# Patient Record
Sex: FEMALE | Race: WHITE | NOT HISPANIC OR LATINO | Employment: UNEMPLOYED | URBAN - NONMETROPOLITAN AREA
[De-identification: names, ages, dates, MRNs, and addresses within clinical notes are randomized per-mention and may not be internally consistent; named-entity substitution may affect disease eponyms.]

---

## 2024-06-04 ENCOUNTER — HOSPITAL ENCOUNTER (INPATIENT)
Facility: HOSPITAL | Age: 39
LOS: 6 days | Discharge: HOME | DRG: 896 | End: 2024-06-10
Attending: PSYCHIATRY & NEUROLOGY | Admitting: PSYCHIATRY & NEUROLOGY
Payer: MEDICAID

## 2024-06-04 ENCOUNTER — HOSPITAL ENCOUNTER (EMERGENCY)
Facility: HOSPITAL | Age: 39
Discharge: PSYCHIATRIC OR SUBSTANCE USE FACILITY | DRG: 896 | End: 2024-06-04
Attending: STUDENT IN AN ORGANIZED HEALTH CARE EDUCATION/TRAINING PROGRAM | Admitting: STUDENT IN AN ORGANIZED HEALTH CARE EDUCATION/TRAINING PROGRAM
Payer: MEDICAID

## 2024-06-04 VITALS
TEMPERATURE: 98.9 F | HEART RATE: 73 BPM | BODY MASS INDEX: 18.83 KG/M2 | SYSTOLIC BLOOD PRESSURE: 157 MMHG | RESPIRATION RATE: 18 BRPM | OXYGEN SATURATION: 96 % | WEIGHT: 120 LBS | HEIGHT: 67 IN | DIASTOLIC BLOOD PRESSURE: 99 MMHG

## 2024-06-04 DIAGNOSIS — F19.10 SUBSTANCE ABUSE: Primary | ICD-10-CM

## 2024-06-04 LAB
ALBUMIN SERPL-MCNC: 4.6 G/DL (ref 3.5–5.2)
ALBUMIN/GLOB SERPL: 1.3 G/DL
ALP SERPL-CCNC: 128 U/L (ref 39–117)
ALT SERPL W P-5'-P-CCNC: 11 U/L (ref 1–33)
AMPHET+METHAMPHET UR QL: POSITIVE
AMPHETAMINES UR QL: POSITIVE
ANION GAP SERPL CALCULATED.3IONS-SCNC: 13.7 MMOL/L (ref 5–15)
AST SERPL-CCNC: 13 U/L (ref 1–32)
B-HCG UR QL: NEGATIVE
BACTERIA UR QL AUTO: ABNORMAL /HPF
BARBITURATES UR QL SCN: NEGATIVE
BASOPHILS # BLD AUTO: 0.03 10*3/MM3 (ref 0–0.2)
BASOPHILS NFR BLD AUTO: 0.3 % (ref 0–1.5)
BENZODIAZ UR QL SCN: NEGATIVE
BILIRUB SERPL-MCNC: 0.7 MG/DL (ref 0–1.2)
BILIRUB UR QL STRIP: ABNORMAL
BUN SERPL-MCNC: 12 MG/DL (ref 6–20)
BUN/CREAT SERPL: 15 (ref 7–25)
BUPRENORPHINE SERPL-MCNC: NEGATIVE NG/ML
CALCIUM SPEC-SCNC: 10.1 MG/DL (ref 8.6–10.5)
CANNABINOIDS SERPL QL: NEGATIVE
CHLORIDE SERPL-SCNC: 101 MMOL/L (ref 98–107)
CLARITY UR: ABNORMAL
CO2 SERPL-SCNC: 21.3 MMOL/L (ref 22–29)
COCAINE UR QL: NEGATIVE
COLOR UR: ABNORMAL
CREAT SERPL-MCNC: 0.8 MG/DL (ref 0.57–1)
DEPRECATED RDW RBC AUTO: 38 FL (ref 37–54)
EGFRCR SERPLBLD CKD-EPI 2021: 96.9 ML/MIN/1.73
EOSINOPHIL # BLD AUTO: 0.01 10*3/MM3 (ref 0–0.4)
EOSINOPHIL NFR BLD AUTO: 0.1 % (ref 0.3–6.2)
ERYTHROCYTE [DISTWIDTH] IN BLOOD BY AUTOMATED COUNT: 14 % (ref 12.3–15.4)
ETHANOL BLD-MCNC: <10 MG/DL (ref 0–10)
ETHANOL UR QL: <0.01 %
FENTANYL UR-MCNC: POSITIVE NG/ML
GLOBULIN UR ELPH-MCNC: 3.5 GM/DL
GLUCOSE SERPL-MCNC: 128 MG/DL (ref 65–99)
GLUCOSE UR STRIP-MCNC: NEGATIVE MG/DL
HCT VFR BLD AUTO: 46.1 % (ref 34–46.6)
HGB BLD-MCNC: 14.8 G/DL (ref 12–15.9)
HGB UR QL STRIP.AUTO: NEGATIVE
HYALINE CASTS UR QL AUTO: ABNORMAL /LPF
IMM GRANULOCYTES # BLD AUTO: 0.05 10*3/MM3 (ref 0–0.05)
IMM GRANULOCYTES NFR BLD AUTO: 0.4 % (ref 0–0.5)
KETONES UR QL STRIP: ABNORMAL
LEUKOCYTE ESTERASE UR QL STRIP.AUTO: NEGATIVE
LYMPHOCYTES # BLD AUTO: 1.44 10*3/MM3 (ref 0.7–3.1)
LYMPHOCYTES NFR BLD AUTO: 12.5 % (ref 19.6–45.3)
MAGNESIUM SERPL-MCNC: 1.9 MG/DL (ref 1.6–2.6)
MCH RBC QN AUTO: 24.5 PG (ref 26.6–33)
MCHC RBC AUTO-ENTMCNC: 32.1 G/DL (ref 31.5–35.7)
MCV RBC AUTO: 76.3 FL (ref 79–97)
METHADONE UR QL SCN: NEGATIVE
MONOCYTES # BLD AUTO: 0.37 10*3/MM3 (ref 0.1–0.9)
MONOCYTES NFR BLD AUTO: 3.2 % (ref 5–12)
NEUTROPHILS NFR BLD AUTO: 83.5 % (ref 42.7–76)
NEUTROPHILS NFR BLD AUTO: 9.62 10*3/MM3 (ref 1.7–7)
NITRITE UR QL STRIP: NEGATIVE
NRBC BLD AUTO-RTO: 0 /100 WBC (ref 0–0.2)
OPIATES UR QL: NEGATIVE
OXYCODONE UR QL SCN: NEGATIVE
PCP UR QL SCN: NEGATIVE
PH UR STRIP.AUTO: 5.5 [PH] (ref 5–8)
PLATELET # BLD AUTO: 464 10*3/MM3 (ref 140–450)
PMV BLD AUTO: 9.2 FL (ref 6–12)
POTASSIUM SERPL-SCNC: 3.6 MMOL/L (ref 3.5–5.2)
PROT SERPL-MCNC: 8.1 G/DL (ref 6–8.5)
PROT UR QL STRIP: ABNORMAL
RBC # BLD AUTO: 6.04 10*6/MM3 (ref 3.77–5.28)
RBC # UR STRIP: ABNORMAL /HPF
REF LAB TEST METHOD: ABNORMAL
SODIUM SERPL-SCNC: 136 MMOL/L (ref 136–145)
SP GR UR STRIP: >1.03 (ref 1–1.03)
SQUAMOUS #/AREA URNS HPF: ABNORMAL /HPF
TRICYCLICS UR QL SCN: NEGATIVE
UROBILINOGEN UR QL STRIP: ABNORMAL
WBC # UR STRIP: ABNORMAL /HPF
WBC NRBC COR # BLD AUTO: 11.52 10*3/MM3 (ref 3.4–10.8)

## 2024-06-04 PROCEDURE — 99285 EMERGENCY DEPT VISIT HI MDM: CPT

## 2024-06-04 PROCEDURE — 80053 COMPREHEN METABOLIC PANEL: CPT | Performed by: PHYSICIAN ASSISTANT

## 2024-06-04 PROCEDURE — 81001 URINALYSIS AUTO W/SCOPE: CPT | Performed by: PHYSICIAN ASSISTANT

## 2024-06-04 PROCEDURE — 63710000001 ONDANSETRON ODT 4 MG TABLET DISPERSIBLE: Performed by: PHYSICIAN ASSISTANT

## 2024-06-04 PROCEDURE — 81025 URINE PREGNANCY TEST: CPT | Performed by: PHYSICIAN ASSISTANT

## 2024-06-04 PROCEDURE — 85025 COMPLETE CBC W/AUTO DIFF WBC: CPT | Performed by: PHYSICIAN ASSISTANT

## 2024-06-04 PROCEDURE — 93005 ELECTROCARDIOGRAM TRACING: CPT | Performed by: STUDENT IN AN ORGANIZED HEALTH CARE EDUCATION/TRAINING PROGRAM

## 2024-06-04 PROCEDURE — 82077 ASSAY SPEC XCP UR&BREATH IA: CPT | Performed by: PHYSICIAN ASSISTANT

## 2024-06-04 PROCEDURE — 83735 ASSAY OF MAGNESIUM: CPT | Performed by: PHYSICIAN ASSISTANT

## 2024-06-04 PROCEDURE — 80307 DRUG TEST PRSMV CHEM ANLYZR: CPT | Performed by: PHYSICIAN ASSISTANT

## 2024-06-04 PROCEDURE — HZ2ZZZZ DETOXIFICATION SERVICES FOR SUBSTANCE ABUSE TREATMENT: ICD-10-PCS | Performed by: PSYCHIATRY & NEUROLOGY

## 2024-06-04 PROCEDURE — 36415 COLL VENOUS BLD VENIPUNCTURE: CPT

## 2024-06-04 RX ORDER — TRAZODONE HYDROCHLORIDE 50 MG/1
50 TABLET ORAL NIGHTLY PRN
Status: DISCONTINUED | OUTPATIENT
Start: 2024-06-04 | End: 2024-06-10 | Stop reason: HOSPADM

## 2024-06-04 RX ORDER — CYCLOBENZAPRINE HCL 10 MG
10 TABLET ORAL 3 TIMES DAILY PRN
Status: DISPENSED | OUTPATIENT
Start: 2024-06-04 | End: 2024-06-09

## 2024-06-04 RX ORDER — ECHINACEA PURPUREA EXTRACT 125 MG
2 TABLET ORAL AS NEEDED
Status: DISCONTINUED | OUTPATIENT
Start: 2024-06-04 | End: 2024-06-10 | Stop reason: HOSPADM

## 2024-06-04 RX ORDER — ONDANSETRON 4 MG/1
4 TABLET, ORALLY DISINTEGRATING ORAL ONCE
Status: COMPLETED | OUTPATIENT
Start: 2024-06-04 | End: 2024-06-04

## 2024-06-04 RX ORDER — CLONIDINE HYDROCHLORIDE 0.1 MG/1
0.1 TABLET ORAL 3 TIMES DAILY PRN
Status: ACTIVE | OUTPATIENT
Start: 2024-06-06 | End: 2024-06-07

## 2024-06-04 RX ORDER — DICYCLOMINE HYDROCHLORIDE 10 MG/1
10 CAPSULE ORAL 3 TIMES DAILY PRN
Status: DISPENSED | OUTPATIENT
Start: 2024-06-04 | End: 2024-06-09

## 2024-06-04 RX ORDER — CLONIDINE HYDROCHLORIDE 0.1 MG/1
0.1 TABLET ORAL ONCE
Status: COMPLETED | OUTPATIENT
Start: 2024-06-04 | End: 2024-06-04

## 2024-06-04 RX ORDER — VERAPAMIL HYDROCHLORIDE 80 MG/1
80 TABLET ORAL ONCE
Status: COMPLETED | OUTPATIENT
Start: 2024-06-04 | End: 2024-06-04

## 2024-06-04 RX ORDER — FAMOTIDINE 20 MG/1
20 TABLET, FILM COATED ORAL 2 TIMES DAILY PRN
Status: DISCONTINUED | OUTPATIENT
Start: 2024-06-04 | End: 2024-06-10 | Stop reason: HOSPADM

## 2024-06-04 RX ORDER — CLONIDINE HYDROCHLORIDE 0.1 MG/1
0.1 TABLET ORAL 2 TIMES DAILY PRN
Status: DISPENSED | OUTPATIENT
Start: 2024-06-07 | End: 2024-06-08

## 2024-06-04 RX ORDER — HYDROXYZINE 50 MG/1
50 TABLET, FILM COATED ORAL EVERY 6 HOURS PRN
Status: DISCONTINUED | OUTPATIENT
Start: 2024-06-04 | End: 2024-06-10 | Stop reason: HOSPADM

## 2024-06-04 RX ORDER — CLONIDINE HYDROCHLORIDE 0.1 MG/1
0.1 TABLET ORAL ONCE AS NEEDED
Status: ACTIVE | OUTPATIENT
Start: 2024-06-08 | End: 2024-06-09

## 2024-06-04 RX ORDER — BENZTROPINE MESYLATE 1 MG/ML
1 INJECTION INTRAMUSCULAR; INTRAVENOUS ONCE AS NEEDED
Status: DISCONTINUED | OUTPATIENT
Start: 2024-06-04 | End: 2024-06-10 | Stop reason: HOSPADM

## 2024-06-04 RX ORDER — IBUPROFEN 400 MG/1
400 TABLET ORAL EVERY 6 HOURS PRN
Status: DISCONTINUED | OUTPATIENT
Start: 2024-06-04 | End: 2024-06-10 | Stop reason: HOSPADM

## 2024-06-04 RX ORDER — ONDANSETRON 4 MG/1
4 TABLET, ORALLY DISINTEGRATING ORAL EVERY 6 HOURS PRN
Status: DISCONTINUED | OUTPATIENT
Start: 2024-06-04 | End: 2024-06-10 | Stop reason: HOSPADM

## 2024-06-04 RX ORDER — ALUMINA, MAGNESIA, AND SIMETHICONE 2400; 2400; 240 MG/30ML; MG/30ML; MG/30ML
15 SUSPENSION ORAL EVERY 6 HOURS PRN
Status: DISCONTINUED | OUTPATIENT
Start: 2024-06-04 | End: 2024-06-10 | Stop reason: HOSPADM

## 2024-06-04 RX ORDER — CLONIDINE HYDROCHLORIDE 0.1 MG/1
0.1 TABLET ORAL 4 TIMES DAILY PRN
Status: DISPENSED | OUTPATIENT
Start: 2024-06-05 | End: 2024-06-06

## 2024-06-04 RX ORDER — BENZONATATE 100 MG/1
100 CAPSULE ORAL 3 TIMES DAILY PRN
Status: DISCONTINUED | OUTPATIENT
Start: 2024-06-04 | End: 2024-06-10 | Stop reason: HOSPADM

## 2024-06-04 RX ORDER — LOPERAMIDE HYDROCHLORIDE 2 MG/1
2 CAPSULE ORAL
Status: DISCONTINUED | OUTPATIENT
Start: 2024-06-04 | End: 2024-06-10 | Stop reason: HOSPADM

## 2024-06-04 RX ORDER — ACETAMINOPHEN 325 MG/1
650 TABLET ORAL EVERY 6 HOURS PRN
Status: DISCONTINUED | OUTPATIENT
Start: 2024-06-04 | End: 2024-06-10 | Stop reason: HOSPADM

## 2024-06-04 RX ORDER — CLONIDINE HYDROCHLORIDE 0.1 MG/1
0.1 TABLET ORAL 4 TIMES DAILY PRN
Status: DISPENSED | OUTPATIENT
Start: 2024-06-04 | End: 2024-06-05

## 2024-06-04 RX ORDER — HYDRALAZINE HYDROCHLORIDE 25 MG/1
25 TABLET, FILM COATED ORAL DAILY PRN
Status: ACTIVE | OUTPATIENT
Start: 2024-06-04 | End: 2024-06-09

## 2024-06-04 RX ORDER — BENZTROPINE MESYLATE 1 MG/1
2 TABLET ORAL ONCE AS NEEDED
Status: DISCONTINUED | OUTPATIENT
Start: 2024-06-04 | End: 2024-06-10 | Stop reason: HOSPADM

## 2024-06-04 RX ADMIN — VERAPAMIL HYDROCHLORIDE 80 MG: 80 TABLET, FILM COATED ORAL at 23:01

## 2024-06-04 RX ADMIN — METOPROLOL TARTRATE 50 MG: 25 TABLET, FILM COATED ORAL at 19:38

## 2024-06-04 RX ADMIN — TRAZODONE HYDROCHLORIDE 50 MG: 50 TABLET ORAL at 23:01

## 2024-06-04 RX ADMIN — CLONIDINE HYDROCHLORIDE 0.1 MG: 0.1 TABLET ORAL at 18:32

## 2024-06-04 RX ADMIN — CLONIDINE HYDROCHLORIDE 0.1 MG: 0.1 TABLET ORAL at 17:57

## 2024-06-04 RX ADMIN — CLONIDINE HYDROCHLORIDE 0.1 MG: 0.1 TABLET ORAL at 21:21

## 2024-06-04 RX ADMIN — ONDANSETRON 4 MG: 4 TABLET, ORALLY DISINTEGRATING ORAL at 17:58

## 2024-06-04 RX ADMIN — HYDROXYZINE HYDROCHLORIDE 50 MG: 50 TABLET, FILM COATED ORAL at 23:01

## 2024-06-04 NOTE — ED PROVIDER NOTES
Subjective   History of Present Illness  38-year-old female presents secondary to need for detox from fentanyl.  Patient denies any IV drug use.  She denies any past medical problems.  She presents by private vehicle.  She denies any suicidal homicidal ideation.      Review of Systems   Constitutional: Negative.  Negative for fever.   HENT: Negative.     Respiratory: Negative.     Cardiovascular: Negative.  Negative for chest pain.   Gastrointestinal: Negative.  Negative for abdominal pain.   Endocrine: Negative.    Genitourinary: Negative.  Negative for dysuria.   Skin: Negative.    Neurological: Negative.    Psychiatric/Behavioral: Negative.  Negative for suicidal ideas.    All other systems reviewed and are negative.      No past medical history on file.    No Known Allergies    No past surgical history on file.    No family history on file.    Social History     Socioeconomic History    Marital status: Single   Tobacco Use    Smoking status: Every Day     Current packs/day: 1.00     Average packs/day: 1 pack/day for 2.4 years (2.4 ttl pk-yrs)     Types: Cigarettes     Start date: 2022     Passive exposure: Current    Smokeless tobacco: Never   Vaping Use    Vaping status: Never Used   Substance and Sexual Activity    Alcohol use: Never    Drug use: Yes     Types: Fentanyl    Sexual activity: Defer           Objective   Physical Exam  Vitals and nursing note reviewed.   Constitutional:       General: She is not in acute distress.     Appearance: She is well-developed. She is not diaphoretic.   HENT:      Head: Normocephalic and atraumatic.      Right Ear: External ear normal.      Left Ear: External ear normal.      Nose: Nose normal.   Eyes:      Conjunctiva/sclera: Conjunctivae normal.      Pupils: Pupils are equal, round, and reactive to light.   Neck:      Vascular: No JVD.      Trachea: No tracheal deviation.   Cardiovascular:      Rate and Rhythm: Normal rate and regular rhythm.      Heart sounds: Normal  heart sounds. No murmur heard.  Pulmonary:      Effort: Pulmonary effort is normal. No respiratory distress.      Breath sounds: Normal breath sounds. No wheezing.   Abdominal:      General: Bowel sounds are normal.      Palpations: Abdomen is soft.      Tenderness: There is no abdominal tenderness.   Musculoskeletal:         General: No deformity. Normal range of motion.      Cervical back: Normal range of motion and neck supple.   Skin:     General: Skin is warm and dry.      Coloration: Skin is not pale.      Findings: No erythema or rash.   Neurological:      Mental Status: She is alert and oriented to person, place, and time.      Cranial Nerves: No cranial nerve deficit.   Psychiatric:         Behavior: Behavior normal.         Thought Content: Thought content normal.         Procedures           ED Course                                 Results for orders placed or performed during the hospital encounter of 06/04/24   Comprehensive Metabolic Panel    Specimen: Arm, Left; Blood   Result Value Ref Range    Glucose 128 (H) 65 - 99 mg/dL    BUN 12 6 - 20 mg/dL    Creatinine 0.80 0.57 - 1.00 mg/dL    Sodium 136 136 - 145 mmol/L    Potassium 3.6 3.5 - 5.2 mmol/L    Chloride 101 98 - 107 mmol/L    CO2 21.3 (L) 22.0 - 29.0 mmol/L    Calcium 10.1 8.6 - 10.5 mg/dL    Total Protein 8.1 6.0 - 8.5 g/dL    Albumin 4.6 3.5 - 5.2 g/dL    ALT (SGPT) 11 1 - 33 U/L    AST (SGOT) 13 1 - 32 U/L    Alkaline Phosphatase 128 (H) 39 - 117 U/L    Total Bilirubin 0.7 0.0 - 1.2 mg/dL    Globulin 3.5 gm/dL    A/G Ratio 1.3 g/dL    BUN/Creatinine Ratio 15.0 7.0 - 25.0    Anion Gap 13.7 5.0 - 15.0 mmol/L    eGFR 96.9 >60.0 mL/min/1.73   Pregnancy, Urine - Urine, Clean Catch    Specimen: Urine, Clean Catch   Result Value Ref Range    HCG, Urine QL Negative Negative   Urinalysis With Microscopic If Indicated (No Culture) - Urine, Clean Catch    Specimen: Urine, Clean Catch   Result Value Ref Range    Color, UA Dark Yellow (A) Yellow, Straw     Appearance, UA Cloudy (A) Clear    pH, UA 5.5 5.0 - 8.0    Specific Gravity, UA >1.030 (H) 1.005 - 1.030    Glucose, UA Negative Negative    Ketones, UA Trace (A) Negative    Bilirubin, UA Small (1+) (A) Negative    Blood, UA Negative Negative    Protein,  mg/dL (2+) (A) Negative    Leuk Esterase, UA Negative Negative    Nitrite, UA Negative Negative    Urobilinogen, UA 0.2 E.U./dL 0.2 - 1.0 E.U./dL   Ethanol    Specimen: Arm, Left; Blood   Result Value Ref Range    Ethanol <10 0 - 10 mg/dL    Ethanol % <0.010 %   Urine Drug Screen - Urine, Clean Catch    Specimen: Urine, Clean Catch   Result Value Ref Range    THC, Screen, Urine Negative Negative    Phencyclidine (PCP), Urine Negative Negative    Cocaine Screen, Urine Negative Negative    Methamphetamine, Ur Positive (A) Negative    Opiate Screen Negative Negative    Amphetamine Screen, Urine Positive (A) Negative    Benzodiazepine Screen, Urine Negative Negative    Tricyclic Antidepressants Screen Negative Negative    Methadone Screen, Urine Negative Negative    Barbiturates Screen, Urine Negative Negative    Oxycodone Screen, Urine Negative Negative    Buprenorphine, Screen, Urine Negative Negative   Magnesium    Specimen: Arm, Left; Blood   Result Value Ref Range    Magnesium 1.9 1.6 - 2.6 mg/dL   CBC Auto Differential    Specimen: Arm, Left; Blood   Result Value Ref Range    WBC 11.52 (H) 3.40 - 10.80 10*3/mm3    RBC 6.04 (H) 3.77 - 5.28 10*6/mm3    Hemoglobin 14.8 12.0 - 15.9 g/dL    Hematocrit 46.1 34.0 - 46.6 %    MCV 76.3 (L) 79.0 - 97.0 fL    MCH 24.5 (L) 26.6 - 33.0 pg    MCHC 32.1 31.5 - 35.7 g/dL    RDW 14.0 12.3 - 15.4 %    RDW-SD 38.0 37.0 - 54.0 fl    MPV 9.2 6.0 - 12.0 fL    Platelets 464 (H) 140 - 450 10*3/mm3    Neutrophil % 83.5 (H) 42.7 - 76.0 %    Lymphocyte % 12.5 (L) 19.6 - 45.3 %    Monocyte % 3.2 (L) 5.0 - 12.0 %    Eosinophil % 0.1 (L) 0.3 - 6.2 %    Basophil % 0.3 0.0 - 1.5 %    Immature Grans % 0.4 0.0 - 0.5 %    Neutrophils,  Absolute 9.62 (H) 1.70 - 7.00 10*3/mm3    Lymphocytes, Absolute 1.44 0.70 - 3.10 10*3/mm3    Monocytes, Absolute 0.37 0.10 - 0.90 10*3/mm3    Eosinophils, Absolute 0.01 0.00 - 0.40 10*3/mm3    Basophils, Absolute 0.03 0.00 - 0.20 10*3/mm3    Immature Grans, Absolute 0.05 0.00 - 0.05 10*3/mm3    nRBC 0.0 0.0 - 0.2 /100 WBC   Fentanyl, Urine - Urine, Clean Catch    Specimen: Urine, Clean Catch   Result Value Ref Range    Fentanyl, Urine Positive (A) Negative   Urinalysis, Microscopic Only - Urine, Clean Catch    Specimen: Urine, Clean Catch   Result Value Ref Range    RBC, UA 3-5 (A) None Seen, 0-2 /HPF    WBC, UA 3-5 (A) None Seen, 0-2 /HPF    Bacteria, UA None Seen None Seen /HPF    Squamous Epithelial Cells, UA 7-12 (A) None Seen, 0-2 /HPF    Hyaline Casts, UA None Seen None Seen /LPF    Methodology Manual Light Microscopy    ECG 12 Lead QT Measurement   Result Value Ref Range    QT Interval 418 ms    QTC Interval 479 ms                 Medical Decision Making  38-year-old female presents secondary to need for detox from fentanyl.  Patient denies any IV drug use.  She denies any past medical problems.  She presents by private vehicle.  She denies any suicidal homicidal ideation.    Amount and/or Complexity of Data Reviewed  Labs: ordered. Decision-making details documented in ED Course.  ECG/medicine tests: ordered.  Discussion of management or test interpretation with external provider(s): On-call psychiatrist via the intake nurse.    Risk  Prescription drug management.        Final diagnoses:   Substance abuse       ED Disposition  ED Disposition       ED Disposition   DC/Transfer to Behavioral Health    Condition   Stable    Comment   --               No follow-up provider specified.       Medication List      No changes were made to your prescriptions during this visit.            Zach Hernandez PA  06/04/24 1936

## 2024-06-04 NOTE — NURSING NOTE
"Assessment completed. Pt brought from [facility]. Pt states, \"I'm really, really sick and the place couldn't handle my detox\" Pt states need \"to get clean\". Pt verbalizes has been using fentanyl daily for 2 years, 2 grams by smoking. Pt COWS 13. Pt rates cravings 10/10 using 0-10 scale. Pt rates anxiety 10/10, using 0-10 scale depression 5/10, using 0-10 scale. Pt current cigarette use 1 ppd smoker for 2 1/2 years. Pt denies any other drug or alcohol use, however, pt UDS + for amphetamine, meth, and fentanyl. PT WBC 11.52.   "

## 2024-06-04 NOTE — NURSING NOTE
Pt given additional po dose of 0.1mg clonidine, pt took with small sip of water and encouraged to remain sitting up.

## 2024-06-04 NOTE — NURSING NOTE
Called via phone and spoke to Dr. DARREN Mallory. Intake assessment, labs and vital signs provided. Instructed to admit chemical dependency unit on clonidine detox, routine orders. RBVOx2.

## 2024-06-05 PROBLEM — F11.20 OPIOID USE DISORDER, SEVERE, DEPENDENCE: Status: ACTIVE | Noted: 2024-06-04

## 2024-06-05 PROBLEM — F15.20 METHAMPHETAMINE USE DISORDER, SEVERE, DEPENDENCE: Status: ACTIVE | Noted: 2024-06-05

## 2024-06-05 PROBLEM — K21.9 GERD (GASTROESOPHAGEAL REFLUX DISEASE): Status: ACTIVE | Noted: 2024-06-05

## 2024-06-05 PROBLEM — E43 SEVERE MALNUTRITION: Status: ACTIVE | Noted: 2024-06-05

## 2024-06-05 LAB
HAV IGM SERPL QL IA: NORMAL
HBV CORE IGM SERPL QL IA: NORMAL
HBV SURFACE AG SERPL QL IA: NORMAL
HCV AB SER QL: NORMAL
QT INTERVAL: 418 MS
QTC INTERVAL: 479 MS

## 2024-06-05 PROCEDURE — 99223 1ST HOSP IP/OBS HIGH 75: CPT | Performed by: PSYCHIATRY & NEUROLOGY

## 2024-06-05 PROCEDURE — 63710000001 ONDANSETRON ODT 4 MG TABLET DISPERSIBLE: Performed by: PSYCHIATRY & NEUROLOGY

## 2024-06-05 PROCEDURE — 80074 ACUTE HEPATITIS PANEL: CPT | Performed by: PSYCHIATRY & NEUROLOGY

## 2024-06-05 RX ORDER — BUPRENORPHINE 2 MG/1
2 TABLET SUBLINGUAL 2 TIMES DAILY
Status: COMPLETED | OUTPATIENT
Start: 2024-06-06 | End: 2024-06-06

## 2024-06-05 RX ORDER — BUPRENORPHINE 2 MG/1
2 TABLET SUBLINGUAL DAILY
Status: COMPLETED | OUTPATIENT
Start: 2024-06-07 | End: 2024-06-07

## 2024-06-05 RX ORDER — NICOTINE 21 MG/24HR
1 PATCH, TRANSDERMAL 24 HOURS TRANSDERMAL
Status: DISCONTINUED | OUTPATIENT
Start: 2024-06-05 | End: 2024-06-10 | Stop reason: HOSPADM

## 2024-06-05 RX ORDER — PANTOPRAZOLE SODIUM 40 MG/1
40 TABLET, DELAYED RELEASE ORAL
Status: DISCONTINUED | OUTPATIENT
Start: 2024-06-05 | End: 2024-06-10 | Stop reason: HOSPADM

## 2024-06-05 RX ORDER — BUPRENORPHINE 2 MG/1
2 TABLET SUBLINGUAL 2 TIMES DAILY
Status: COMPLETED | OUTPATIENT
Start: 2024-06-05 | End: 2024-06-05

## 2024-06-05 RX ADMIN — BUPRENORPHINE HCL 2 MG: 2 TABLET SUBLINGUAL at 14:56

## 2024-06-05 RX ADMIN — ONDANSETRON 4 MG: 4 TABLET, ORALLY DISINTEGRATING ORAL at 13:49

## 2024-06-05 RX ADMIN — CLONIDINE HYDROCHLORIDE 0.1 MG: 0.1 TABLET ORAL at 13:44

## 2024-06-05 RX ADMIN — ALUMINUM HYDROXIDE, MAGNESIUM HYDROXIDE, AND DIMETHICONE 15 ML: 400; 400; 40 SUSPENSION ORAL at 00:09

## 2024-06-05 RX ADMIN — CYCLOBENZAPRINE 10 MG: 10 TABLET, FILM COATED ORAL at 08:35

## 2024-06-05 RX ADMIN — IBUPROFEN 400 MG: 400 TABLET, FILM COATED ORAL at 08:34

## 2024-06-05 RX ADMIN — PANTOPRAZOLE SODIUM 40 MG: 40 TABLET, DELAYED RELEASE ORAL at 14:06

## 2024-06-05 RX ADMIN — FAMOTIDINE 20 MG: 20 TABLET, FILM COATED ORAL at 09:55

## 2024-06-05 RX ADMIN — LOPERAMIDE HYDROCHLORIDE 2 MG: 2 CAPSULE ORAL at 08:35

## 2024-06-05 RX ADMIN — HYDROXYZINE HYDROCHLORIDE 50 MG: 50 TABLET, FILM COATED ORAL at 08:34

## 2024-06-05 RX ADMIN — Medication 1 PATCH: at 22:02

## 2024-06-05 RX ADMIN — ONDANSETRON 4 MG: 4 TABLET, ORALLY DISINTEGRATING ORAL at 08:32

## 2024-06-05 RX ADMIN — CLONIDINE HYDROCHLORIDE 0.1 MG: 0.1 TABLET ORAL at 04:34

## 2024-06-05 RX ADMIN — LOPERAMIDE HYDROCHLORIDE 2 MG: 2 CAPSULE ORAL at 13:48

## 2024-06-05 RX ADMIN — BUPRENORPHINE HCL 2 MG: 2 TABLET SUBLINGUAL at 22:00

## 2024-06-05 RX ADMIN — DICYCLOMINE HYDROCHLORIDE 10 MG: 10 CAPSULE ORAL at 08:34

## 2024-06-05 NOTE — CONSULTS
Adult Nutrition  Assessment/PES    Patient Name:  Stephy Foley  YOB: 1985  MRN: [id number]  Admit Date:  6/4/2024    Assessment Date:  6/5/2024    Comments:  patient meets criteria for severe malnutrition related to poor intakes greater than a month and severe weight loss (12%) in last month. Will start ONS BID and monitor intakes.  Recommend adding a MVI related to poor intakes.     Reason for Assessment       Row Name 06/05/24 1020          Reason for Assessment    Reason For Assessment identified at risk by screening criteria     Identified At Risk by Screening Criteria reduced oral intake over the last month                      Labs/Tests/Procedures/Meds       Row Name 06/05/24 1020          Labs/Procedures/Meds    Lab Results Reviewed reviewed        Medications    Pertinent Medications Reviewed reviewed                      Estimated/Assessed Needs - Anthropometrics       Row Name 06/05/24 1021          Anthropometrics    Age for Calculations 38     Weight for Calculation 47.9 kg (105 lb 9.6 oz)     Additional Documentation Ideal Body Weight (IBW) (Group)        Ideal Body Weight (IBW)    Ideal Body Weight 61.6kg        Estimated/Assessed Needs    Additional Documentation Fluid Requirements (Group);Phoenix-St. Jeor Equation (Group);KCAL/KG (Group);Protein Requirements (Group)        KCAL/KG    KCAL/KG 35 Kcal/Kg (kcal);30 Kcal/Kg (kcal)     30 Kcal/Kg (kcal) 1437     35 Kcal/Kg (kcal) 1676.5        Phoenix-St. Jeor Equation    RMR (Phoenix-St. Jeor Equation) 1191.625     Phoenix-St. Jeor Activity Factors 1.4 - 1.5     Activity Factors (Phoenix-St. Jeor) 1668.275 - 1787.4373        Protein Requirements    Weight Used For Protein Calculations 47.9 kg (105 lb 9.6 oz)     Est Protein Requirement Amount (gms/kg) 1.5 gm protein     Estimated Protein Requirements (gms/day) 71.85        Fluid Requirements    Fluid Requirements (mL/day) 1787     Estimated Fluid Requirement Method RDA Method      RDA Method (mL) 1787                    Nutrition Prescription Ordered       Row Name 06/05/24 1022          Nutrition Prescription PO    Current PO Diet Regular     Fluid Consistency Thin                    Evaluation of Received Nutrient/Fluid Intake       Row Name 06/05/24 1022          PO Evaluation    Number of Meals 1     % PO Intake 25                    Malnutrition Severity Assessment       Row Name 06/05/24 1022          Malnutrition Severity Assessment    Malnutrition Type Starvation - Related Malnutrition        Insufficient Energy Intake     Insufficient Energy Intake Findings Severe     Insufficient Energy Intake  <50% of est. energy requirement for >or equal to 1 month        Unintentional Weight Loss     Unintentional Weight Loss Findings Severe     Unintentional Weight Loss  Weight loss greater than 7.5% in three months        Muscle Loss    Loss of Muscle Mass Findings Moderate        Fat Loss    Subcutaneous Fat Loss Findings Severe        Criteria Met (Must meet criteria for severity in at least 2 of these categories: M Wasting, Fat Loss, Fluid, Secondary Signs, Wt. Status, Intake)    Patient meets criteria for  Severe Malnutrition                     Problem/Interventions:   Problem 1       Row Name 06/05/24 1023          Nutrition Diagnoses Problem 1    Problem 1 Malnutrition     Etiology (related to) Factors Affecting Nutrition;Medical Diagnosis     Substance Use Drug/illicit/recreational     Reported/Observed By MD;Nursing Assistant     Appetite Poor     Signs/Symptoms (evidenced by) Report of Mnimal PO Intake;% IBW;Unintended Weight Change     Percent (%) IBW 78 %     Unintended Weight Change Loss     Number of Pounds Lost 15     Weight loss time period less than month                          Intervention Goal       Row Name 06/05/24 1025          Intervention Goal    General Meet nutritional needs for age/condition     PO Establish PO;Meet estimated needs     Weight Appropriate weight gain                     Nutrition Intervention       Row Name 06/05/24 1025          Nutrition Intervention    RD/Tech Action Recommend/ordered;Follow Tx progress     Recommended/Ordered Supplement                    Nutrition Prescription       Row Name 06/05/24 1025          Nutrition Prescription PO    PO Prescription Begin/change supplement     Fluid Consistency Thin     Supplement Boost Plus     Supplement Frequency 2 times a day                    Education/Evaluation       Row Name 06/05/24 1025          Education    Education No discharge needs identified at this time        Monitor/Evaluation    Monitor Per protocol;PO intake;Supplement intake;Pertinent labs     Education Follow-up Other (comment)  will continue to follow                     Electronically signed by:  Hillary Waldron RD  06/05/24 10:27 EDT

## 2024-06-05 NOTE — PLAN OF CARE
Goal Outcome Evaluation:  Plan of Care Reviewed With: patient  Patient Agreement with Plan of Care: agrees     Progress: improving  Outcome Evaluation: Pt cooperative with staff. Pt appetite remains poor and reports problems sleeping. Pt rates anxiety 9/10 and depression 9/10. Pt rates cravings 10/10. Pt denies SI, HI, and AVH.

## 2024-06-05 NOTE — PLAN OF CARE
Goal Outcome Evaluation:        Problem: Adult Behavioral Health Plan of Care  Goal: Patient-Specific Goal (Individualization)  Outcome: Ongoing, Progressing  Flowsheets  Taken 6/5/2024 1001 by Nery Arita CSW  Patient-Specific Goals (Include Timeframe): Identify 2-3 copoing skills, address relapse prevention methods, complete aftercare plans, and deny SI/HI prior to discharge.  Individualized Care Needs: Therapist to offe r1-4 therapy sessions, aftercare planning, safety planning, family education, group therapy, and brief CBT/MI interventions.  Taken 6/5/2024 0954 by Nery Arita CSW  Patient Personal Strengths:   resilient   resourceful   motivated for treatment   motivated for recovery  Patient Vulnerabilities:   substance abuse/addiction   history of unsuccessful treatment   occupational insecurity   housing insecurity   poor impulse control   lacks insight into illness   limited support system  Taken 6/4/2024 2000 by Lisa Brewer RN  Anxieties, Fears or Concerns: None verbalized  Goal: Optimized Coping Skills in Response to Life Stressors  Outcome: Ongoing, Progressing  Flowsheets (Taken 6/5/2024 1001)  Optimized Coping Skills in Response to Life Stressors: making progress toward outcome  Intervention: Promote Effective Coping Strategies  Flowsheets (Taken 6/5/2024 1001)  Supportive Measures:   active listening utilized   counseling provided   decision-making supported   goal-setting facilitated   verbalization of feelings encouraged   self-responsibility promoted   self-reflection promoted   positive reinforcement provided   self-care encouraged  Goal: Develops/Participates in Therapeutic [place] to Support Successful Transition  Outcome: Ongoing, Progressing  Flowsheets (Taken 6/5/2024 1001)  Develops/Participates in Therapeutic [place] to Support Successful Transition: making progress toward outcome  Intervention: Foster Therapeutic [place]  Flowsheets (Taken 6/5/2024 1001)  Trust  Relationship/Rapport:   care explained   questions encouraged   choices provided   reassurance provided   emotional support provided   thoughts/feelings acknowledged   empathic listening provided   questions answered  Intervention: Mutually Develop Transition Plan  Flowsheets  Taken 6/5/2024 1001  Outpatient/Agency/Support Group Needs: residential services  Transition Support:   follow-up care discussed   follow-up care coordinated   community resources reviewed   crisis management plan promoted   crisis management plan verbalized  Anticipated Discharge Disposition: residential substance use unit  Taken 6/5/2024 1000  Discharge Coordination/Progress: Patient does not have insurance coverage. Therapist met with patient to complete assessment. Patient presents from [facility].  Transportation Anticipated: agency  Transportation Concerns: no car  Current Discharge Risk:   substance use/abuse   homeless  Concerns to be Addressed:   substance/tobacco abuse/use   homelessness   coping/stress   cognitive/perceptual   mental health   discharge planning  Readmission Within the Last 30 Days: no previous admission in last 30 days  Patient/Family Anticipated Services at Transition: rehabilitation services  Patient's Choice of Community Agency(s): [facility]  Patient/Family Anticipates Transition to: inpatient rehabilitation facility  Offered/Gave Vendor List: no      DATA:      Therapist met individually with patient this date to introduce role and to discuss hospitalization expectations. Patient agreeable.    Patient signed consent for [facility].       Clinical Maneuvering/Intervention:     Therapist assisted patient in processing session content; acknowledged and normalized patient’s thoughts, feelings, and concerns. Discussed the therapist/patient relationship and explain the parameters and limitations of relative confidentiality. Also discussed the importance of active participation, and honesty to the treatment process.  Encouraged the patient to discuss/vent their feelings, frustrations, and fears concerning their ongoing medical issues and validated their feelings.     Discussed the importance of finding enjoyable activities and coping skills that the patient can engage in a regular basis. Discussed healthy coping skills such as distraction, self love, grounding, thought challenges/reframing, etc. Provided patient with list of healthy coping skills this date. Discussed the importance of medication compliance. Praised the patient for seeking help and spent the majority of the session building rapport.       Allowed patient to freely discuss issues without interruption or judgment. Provided safe, confidential environment to facilitate the development of positive therapeutic relationship and encourage open, honest communication.      Therapist addressed discharge safety planning this date. Assisted patient in identifying risk factors which would indicate the need for higher level of care after discharge;  including thoughts to harm self or others and/or self-harming behavior. Encouraged patient to call 911, or present to the nearest emergency room should any of these events occur. Discussed crisis intervention services and means to access. Encouraged securing any objects of harm.       Therapist completed integrated summary, treatment plan, and initiated social history this date. Therapist is strongly encouraging family involvement in treatment.       ASSESSMENT:      The patient is a 37 y/o female admitted for detox treatment and polysubstance abuse. Therapist met with patient 1-1 on this date to complete assessment. Patient reports both anxiety and depression to be about an 8 or 9 out of 10, denies SI/HI/AVH. She reports experiencing chills, body aches, vomiting, nausea, restlessness, diarrhea, and fatigue. Patient has been using fentanyl and methamphetamine. She began using at 20 y/o, longest period of sobriety has been a couple  weeks. Patient is currently homeless and unemployed. She has had no past [facility] admissions. Patient was brought to the ED by [facility] and plans to return at discharge. Agency to provide transportation pending acceptance. Patient is not agreeable to family involvement in treatment, denies having any family support right now.     PLAN:       Patient to remain hospitalized this date.     Treatment team will focus efforts on stabilizing patient's acute symptoms while providing education on healthy coping and crisis management to reduce hospitalizations. Patient requires daily psychiatrist evaluation and 24/7 nursing supervision to promote patient safety.     Therapist will offer 1-4 individual sessions, 1 therapy group daily, family education, and appropriate referral.    Therapist recommends [facility] residential rehab.

## 2024-06-05 NOTE — PROGRESS NOTES
Navigator is helping with the following referrals:    [facility] - [phone]  -Spoke with Nery. They will accept patient at discharge. 6/5

## 2024-06-05 NOTE — H&P
INITIAL PSYCHIATRIC HISTORY & PHYSICAL    Patient Identification:  Name:  Stephy Foley  Age:  38 y.o.  Sex:  female  :  1985  MRN:  [id number]   Visit Number:  [id number]  Primary Care Physician:  Provider, No Known    SUBJECTIVE    CC/Focus of Exam: fentanyl and meth use.     HPI: Stephy Foley is a 38 y.o. female who was admitted on 2024 with complaints of fentanyl and meth use and withdrawals. The patient reports a long history of substance use. First use was at age 19 when she started using weed. Two years ago she started using pain pills and methamphetamine. Over time the use increased and the patient  continued to use despite negative consequences including relationship problems, social and financial problems. The patient endorses symptoms of tolerance and withdrawals and ongoing cravings to use. Has tried to cut down and stop but has not been successful. Spends too much time and resources in pursuit of substance use. Longest period of sobriety is reported to be a couple of weeks.   Currently using fentanyl 2-3 gm daily by smoking  and 1 gm of ice via smoking. She uses marijuana sporadically.   Last use was 3 days ago.   Withdrawal symptoms include restlessness, feeling sick, feeling horrible, body aches, nausea, vomiting, diarrhea.     PAST PSYCHIATRIC HX: Patient reports she has been treated for depression in the past.     SUBSTANCE USE HX: See HPI.     SOCIAL HX:   Social History     Socioeconomic History    Marital status: Legally     Number of children: 0    Highest education level: 11th grade   Tobacco Use    Smoking status: Every Day     Current packs/day: 1.00     Types: Cigarettes     Passive exposure: Current    Smokeless tobacco: Never   Vaping Use    Vaping status: Never Used   Substance and Sexual Activity    Alcohol use: Not Currently    Drug use: Yes     Types: Fentanyl    Sexual activity: Defer         Past Medical History:   Diagnosis Date    Substance  abuse     Withdrawal symptoms, drug or narcotic           Past Surgical History:   Procedure Laterality Date    NO PAST SURGERIES         History reviewed. No pertinent family history.      No medications prior to admission.         ALLERGIES:  Patient has no known allergies.    Temp:  [97.3 °F (36.3 °C)-99.5 °F (37.5 °C)] 98 °F (36.7 °C)  Heart Rate:  [] 111  Resp:  [16-20] 16  BP: (129-177)/() 129/94    REVIEW OF SYSTEMS:  Review of Systems   Constitutional:  Positive for chills, diaphoresis and fatigue.   HENT: Negative.     Respiratory: Negative.     Cardiovascular: Negative.    Gastrointestinal:  Positive for abdominal pain, diarrhea, nausea and vomiting.   Endocrine: Negative.    Genitourinary: Negative.    Musculoskeletal:  Positive for arthralgias and myalgias.   Skin: Negative.    Allergic/Immunologic: Negative.    Neurological:  Positive for tremors and weakness.   Hematological: Negative.    Psychiatric/Behavioral:  Positive for dysphoric mood. The patient is nervous/anxious.         OBJECTIVE    PHYSICAL EXAM:  Physical Exam  Constitutional:  Appears well-developed and well-nourished.   HENT:   Head: Normocephalic and atraumatic.   Right Ear: External ear normal.   Left Ear: External ear normal.   Mouth/Throat: Oropharynx is clear and moist.   Eyes: Pupils are equal, round, and reactive to light. Conjunctivae and EOM are normal.   Neck: Normal range of motion. Neck supple.   Cardiovascular: Normal rate, regular rhythm and normal heart sounds.    Respiratory: Effort normal and breath sounds normal. No respiratory distress. No wheezes.   GI: Soft. Bowel sounds are normal.No distension. There is no tenderness.   Musculoskeletal: Normal range of motion. No edema or deformity.   Neurological:No cranial nerve deficit. Coordination normal.   Skin: Skin is warm and dry. No rash noted. No erythema.     MENTAL STATUS EXAM:   Hygiene:   fair  Cooperation:  Cooperative  Eye Contact:  Fair  Psychomotor  Behavior:  Appropriate  Affect:  Appropriate  Hopelessness: Denies  Speech:  Normal  Thought Process: Goal directed  Thought Content:  Normal  Suicidal:  None  Homicidal:  None  Hallucinations:  None  Delusion:  None  Memory:  Intact  Orientation:  Person, Place, Time and Situation  Reliability:  fair  Insight:  Fair  Judgement:  Fair  Impulse Control:  Fair    Imaging Results (Last 24 Hours)       ** No results found for the last 24 hours. **             ECG/EMG Results (most recent)       None             Lab Results   Component Value Date    GLUCOSE 128 (H) 06/04/2024    BUN 12 06/04/2024    CREATININE 0.80 06/04/2024    BCR 15.0 06/04/2024    CO2 21.3 (L) 06/04/2024    CALCIUM 10.1 06/04/2024    ALBUMIN 4.6 06/04/2024    AST 13 06/04/2024    ALT 11 06/04/2024       Lab Results   Component Value Date    WBC 11.52 (H) 06/04/2024    HGB 14.8 06/04/2024    HCT 46.1 06/04/2024    MCV 76.3 (L) 06/04/2024     (H) 06/04/2024       Last Urine Toxicity          Latest Ref Rng & Units 6/4/2024   LAST URINE TOXICITY RESULTS   Amphetamine, Urine Qual Negative Positive    Barbiturates Screen, Urine Negative Negative    Benzodiazepine Screen, Urine Negative Negative    Buprenorphine, Screen, Urine Negative Negative    Cocaine Screen, Urine Negative Negative    Fentanyl, Urine Negative Positive    Methadone Screen , Urine Negative Negative    Methamphetamine, Ur Negative Positive        Brief Urine Lab Results  (Last result in the past 365 days)        Color   Clarity   Blood   Leuk Est   Nitrite   Protein   CREAT   Urine HCG        06/04/24 1703 Dark Yellow   Cloudy   Negative   Negative   Negative   100 mg/dL (2+)           06/04/24 1703               Negative               DATA  Labs reviewed. Glucose 128, Alk phos 128. .52, platelets 642, MCV 76.3, MCH 24.5. UDS positive for amphetamine, fentanyl, methamphetamine,   EKG reviewed. QTc interval 479 ms.   SCHUYLER reviewed.   Record reviewed. No previous treatment  noted in this hospital for mental health or substance use problems.       Strengths: Motivated for treatment    Weaknesses:Substance use and Poor coping skills    Code status:  Full  Discussed code status with patient.    ASSESSMENT & PLAN:    Hospital bed: No      Opioid use disorder, severe, dependence  -Clonidine detox  -Comfort meds  -Start Subutex detox      Methamphetamine use disorder, severe, dependence  -Supportive treatment      Severe malnutrition  -Appreciate nutrition consult and recommendations      GERD (gastroesophageal reflux disease)  -Start pantoprazole 40 mg daily    The patient has been admitted for safety and stabilization.  Patient will be monitored for suicidality daily and maintained on Special Precautions Level 4 (q30 min checks).  The patient will have individual and group therapy with a master's level therapist. A master treatment plan will be developed and agreed upon by the patient and his/her treatment team.  The patient's estimated length of stay in the hospital is 5-7 days.

## 2024-06-06 PROCEDURE — 99232 SBSQ HOSP IP/OBS MODERATE 35: CPT | Performed by: PSYCHIATRY & NEUROLOGY

## 2024-06-06 PROCEDURE — 63710000001 ONDANSETRON ODT 4 MG TABLET DISPERSIBLE: Performed by: PSYCHIATRY & NEUROLOGY

## 2024-06-06 RX ADMIN — BUPRENORPHINE HCL 2 MG: 2 TABLET SUBLINGUAL at 08:20

## 2024-06-06 RX ADMIN — BUPRENORPHINE HCL 2 MG: 2 TABLET SUBLINGUAL at 21:30

## 2024-06-06 RX ADMIN — HYDROXYZINE HYDROCHLORIDE 50 MG: 50 TABLET, FILM COATED ORAL at 17:48

## 2024-06-06 RX ADMIN — TRAZODONE HYDROCHLORIDE 50 MG: 50 TABLET ORAL at 22:05

## 2024-06-06 RX ADMIN — ONDANSETRON 4 MG: 4 TABLET, ORALLY DISINTEGRATING ORAL at 21:30

## 2024-06-06 RX ADMIN — PANTOPRAZOLE SODIUM 40 MG: 40 TABLET, DELAYED RELEASE ORAL at 06:31

## 2024-06-06 RX ADMIN — IBUPROFEN 400 MG: 400 TABLET, FILM COATED ORAL at 17:48

## 2024-06-06 RX ADMIN — ONDANSETRON 4 MG: 4 TABLET, ORALLY DISINTEGRATING ORAL at 14:35

## 2024-06-06 RX ADMIN — Medication 1 PATCH: at 08:21

## 2024-06-06 RX ADMIN — ACETAMINOPHEN 650 MG: 325 TABLET ORAL at 14:35

## 2024-06-06 NOTE — PROGRESS NOTES
"INPATIENT PSYCHIATRIC PROGRESS NOTE    Name:  Stephy Foley  :  1985  MRN:  [id number]  Visit Number:  [id number]  Length of stay:  2    SUBJECTIVE    CC/Focus of Exam: Opioid use and withdrawals    INTERVAL HISTORY:  The patient reports she is feeling better and Subutex is helping with the withdrawal symptoms.   Depression rating 10  Anxiety rating /10  Sleep: better  Withdrawal sx: some lightheadedness.   Cravin/10    Review of Systems   Respiratory: Negative.     Cardiovascular: Negative.    Gastrointestinal: Negative.    Neurological:  Positive for weakness.   Psychiatric/Behavioral:  Positive for dysphoric mood. The patient is nervous/anxious.        OBJECTIVE    Temp:  [97.4 °F (36.3 °C)-99.9 °F (37.7 °C)] 97.4 °F (36.3 °C)  Heart Rate:  [] 93  Resp:  [16-18] 18  BP: (104-139)/(71-94) 104/71    MENTAL STATUS EXAM:  Appearance:Casually dressed, good hygeine.   Cooperation:Cooperative  Psychomotor: No psychomotor agitation/retardation, No EPS, No motor tics  Speech-normal rate, amount.  Mood \"anxious\"   Affect-congruent, appropriate, stable  Thought Content-goal directed, no delusional material present  Thought process-linear, organized.  Suicidality: No SI  Homicidality: No HI  Perception: No AH/VH  Insight-fair   Judgement-fair    Lab Results (last 24 hours)       ** No results found for the last 24 hours. **               Imaging Results (Last 24 Hours)       ** No results found for the last 24 hours. **               ECG/EMG Results (most recent)       None             ALLERGIES: Patient has no known allergies.    Medication Review:   Scheduled Medications:  buprenorphine, 2 mg, Sublingual, BID   Followed by  [START ON 2024] buprenorphine, 2 mg, Sublingual, Daily  nicotine, 1 patch, Transdermal, Q24H  pantoprazole, 40 mg, Oral, Q AM         PRN Medications:    acetaminophen    aluminum-magnesium hydroxide-simethicone    benzonatate    benztropine **OR** benztropine    [] " cloNIDine **FOLLOWED BY** cloNIDine **FOLLOWED BY** [START ON 6/7/2024] cloNIDine **FOLLOWED BY** [START ON 6/8/2024] cloNIDine    cyclobenzaprine    dicyclomine    famotidine    hydrALAZINE    hydrOXYzine    ibuprofen    loperamide    magnesium hydroxide    ondansetron ODT    sodium chloride    traZODone   All medications reviewed.    ASSESSMENT & PLAN:      Opioid use disorder, severe, dependence  -Clonidine detox  -Comfort meds  -Continue Subutex detox       Methamphetamine use disorder, severe, dependence  -Supportive treatment       Severe malnutrition  -Appreciate nutrition consult and recommendations       GERD (gastroesophageal reflux disease)  -Started pantoprazole 40 mg daily    Special precautions: Special Precautions Level 4 (q30 min checks).    Behavioral Health Treatment Plan and Problem List: I have reviewed and approved the Behavioral Health Treatment Plan and Problem list.  The patient has had a chance to review and agrees with the treatment plan.    Copied text in portions of this note has been reviewed and is accurate as of 06/06/24         Clinician:  Tayler Jeff MD  06/06/24  10:27 EDT

## 2024-06-06 NOTE — PLAN OF CARE
Goal Outcome Evaluation:  Plan of Care Reviewed With: patient  Patient Agreement with Plan of Care: agrees     Progress: improving  Outcome Evaluation: Pt has been calm and cooperative, spends majority of the time in room resting. Pt rates anxiety 8, depression 7, states sleep is poor and appetite is okay. Pt denies SI/HI/AVH. Pt rates cravings 10, c/o restlessness and leg cramps.

## 2024-06-06 NOTE — NURSING NOTE
Dr. Jeff aware of pt's elevated - pt to receive scheduled Subutex 2mg.   No new orders at this time.

## 2024-06-06 NOTE — PLAN OF CARE
Goal Outcome Evaluation:        Problem: Adult Behavioral Health Plan of Care  Goal: Patient-Specific Goal (Individualization)  Outcome: Ongoing, Progressing  Flowsheets  Taken 6/5/2024 1001 by Nery Arita CSW  Patient-Specific Goals (Include Timeframe): Identify 2-3 copoing skills, address relapse prevention methods, complete aftercare plans, and deny SI/HI prior to discharge.  Individualized Care Needs: Therapist to offe r1-4 therapy sessions, aftercare planning, safety planning, family education, group therapy, and brief CBT/MI interventions.  Taken 6/5/2024 0954 by Nery Arita CSW  Patient Personal Strengths:   resilient   resourceful   motivated for treatment   motivated for recovery  Patient Vulnerabilities:   substance abuse/addiction   history of unsuccessful treatment   occupational insecurity   housing insecurity   poor impulse control   lacks insight into illness   limited support system  Taken 6/4/2024 2000 by Lisa Brewer RN  Anxieties, Fears or Concerns: None verbalized  Goal: Optimized Coping Skills in Response to Life Stressors  Outcome: Ongoing, Progressing  Flowsheets (Taken 6/5/2024 1001)  Optimized Coping Skills in Response to Life Stressors: making progress toward outcome  Intervention: Promote Effective Coping Strategies  Flowsheets (Taken 6/6/2024 1116)  Supportive Measures:   active listening utilized   counseling provided   decision-making supported   goal-setting facilitated   verbalization of feelings encouraged   self-responsibility promoted   positive reinforcement provided   self-reflection promoted   self-care encouraged  Goal: Develops/Participates in Therapeutic [place] to Support Successful Transition  Outcome: Ongoing, Progressing  Flowsheets (Taken 6/5/2024 1001)  Develops/Participates in Therapeutic [place] to Support Successful Transition: making progress toward outcome  Intervention: Foster Therapeutic [place]  Flowsheets (Taken 6/6/2024 1116)  Trust  Relationship/Rapport:   care explained   reassurance provided   choices provided   thoughts/feelings acknowledged   emotional support provided   empathic listening provided   questions answered   questions encouraged  Intervention: Mutually Develop Transition Plan  Flowsheets  Taken 6/6/2024 1112  Discharge Coordination/Progress: Patient does not have insurance coverage. Patient to return to [facility] at discharge, agency to transport.  Transportation Anticipated: agency  Transportation Concerns: no car  Current Discharge Risk:   substance use/abuse   homeless  Concerns to be Addressed:   substance/tobacco abuse/use   homelessness   coping/stress   cognitive/perceptual   mental health   discharge planning  Readmission Within the Last 30 Days: no previous admission in last 30 days  Patient/Family Anticipated Services at Transition: rehabilitation services  Patient's Choice of Community Agency(s): [facility]  Patient/Family Anticipates Transition to: inpatient rehabilitation facility  Offered/Gave Vendor List: no  Taken 6/5/2024 1001  Outpatient/Agency/Support Group Needs: residential services  Transition Support:   follow-up care discussed   follow-up care coordinated   community resources reviewed   crisis management plan promoted   crisis management plan verbalized  Anticipated Discharge Disposition: residential substance use unit      DATA:  Therapist met with Patient individually this date. Patient agreeable to discuss current treatment progress and discharge concerns.     CLINICAL MANUVERING/INTERVENTIONS:    Assisted Patient in processing session content; acknowledged and normalized Patient’s thoughts, feelings, and concerns by utilizing a person-centered approach in efforts to build appropriate rapport and a positive therapeutic relationship with open and honest communication. Allowed Patient to ventilate regarding current stressors and triggers for negative emotions and thoughts in a safe nonjudgmental environment with  unconditional positive regard, active listening skills, and empathy. Therapist implemented motivational interviewing techniques to assist Patient with exploring personal growth and change and discussed distress tolerance skills, self soothing techniques, and applied cognitive behavioral strategies to facilitate identification of maladaptive patterns of thinking and behavior.Therapist utilized dialectical behavior techniques to teach and model emotional regulation and relaxation methods. Therapist assisted Patient with identifying and implementing healthier coping strategies. Therapist assisted Patient with safety planning; Patient agreed to continue honest communication with Treatment Team while inpatient and identify any SI/HI. Patient encouraged to seek nearest ER or contact 911 if danger to self or others post discharge.     ASSESSMENT:  Patient continues to receive treatment for opioid detox. She reports high anxiety and depression, denies SI/HI/AVH. Patient reports Subutex is helping with withdrawal symptoms, only experiencing lightheadedness and weakness. Patient plans to return to [facility] when stable, agency will provide transportation. Reviewed healthy coping skills and relapse prevention.    PLAN:   Patient will continue stabilization. Patient will continue to receive services offered by Treatment Team.     Patient to return to [facility] at discharge.

## 2024-06-06 NOTE — PLAN OF CARE
Goal Outcome Evaluation:  Plan of Care Reviewed With: patient  Patient Agreement with Plan of Care: agrees     Progress: improving     PT in room entire shift. Slept well. Appetite is poor for shift. Pt did not require prn medications for this shift.

## 2024-06-07 PROCEDURE — 63710000001 ONDANSETRON ODT 4 MG TABLET DISPERSIBLE: Performed by: PSYCHIATRY & NEUROLOGY

## 2024-06-07 PROCEDURE — 99232 SBSQ HOSP IP/OBS MODERATE 35: CPT | Performed by: PSYCHIATRY & NEUROLOGY

## 2024-06-07 RX ORDER — BUPRENORPHINE 2 MG/1
2 TABLET SUBLINGUAL DAILY
Status: COMPLETED | OUTPATIENT
Start: 2024-06-07 | End: 2024-06-08

## 2024-06-07 RX ADMIN — ACETAMINOPHEN 650 MG: 325 TABLET ORAL at 08:15

## 2024-06-07 RX ADMIN — HYDROXYZINE HYDROCHLORIDE 50 MG: 50 TABLET, FILM COATED ORAL at 21:04

## 2024-06-07 RX ADMIN — ONDANSETRON 4 MG: 4 TABLET, ORALLY DISINTEGRATING ORAL at 08:14

## 2024-06-07 RX ADMIN — PANTOPRAZOLE SODIUM 40 MG: 40 TABLET, DELAYED RELEASE ORAL at 06:20

## 2024-06-07 RX ADMIN — TRAZODONE HYDROCHLORIDE 50 MG: 50 TABLET ORAL at 21:04

## 2024-06-07 RX ADMIN — CYCLOBENZAPRINE 10 MG: 10 TABLET, FILM COATED ORAL at 21:04

## 2024-06-07 RX ADMIN — BUPRENORPHINE HCL 2 MG: 2 TABLET SUBLINGUAL at 12:19

## 2024-06-07 RX ADMIN — ONDANSETRON 4 MG: 4 TABLET, ORALLY DISINTEGRATING ORAL at 21:04

## 2024-06-07 RX ADMIN — HYDROXYZINE HYDROCHLORIDE 50 MG: 50 TABLET, FILM COATED ORAL at 08:15

## 2024-06-07 RX ADMIN — IBUPROFEN 400 MG: 400 TABLET, FILM COATED ORAL at 21:04

## 2024-06-07 RX ADMIN — CLONIDINE HYDROCHLORIDE 0.1 MG: 0.1 TABLET ORAL at 15:00

## 2024-06-07 RX ADMIN — BUPRENORPHINE HCL 2 MG: 2 TABLET SUBLINGUAL at 08:15

## 2024-06-07 NOTE — PLAN OF CARE
Goal Outcome Evaluation:        Problem: Adult Behavioral Health Plan of Care  Goal: Patient-Specific Goal (Individualization)  Outcome: Ongoing, Progressing  Flowsheets  Taken 6/5/2024 1001 by Nery Arita CSW  Patient-Specific Goals (Include Timeframe): Identify 2-3 copoing skills, address relapse prevention methods, complete aftercare plans, and deny SI/HI prior to discharge.  Individualized Care Needs: Therapist to offe r1-4 therapy sessions, aftercare planning, safety planning, family education, group therapy, and brief CBT/MI interventions.  Taken 6/5/2024 0954 by Nery Arita CSW  Patient Personal Strengths:   resilient   resourceful   motivated for treatment   motivated for recovery  Patient Vulnerabilities:   substance abuse/addiction   history of unsuccessful treatment   occupational insecurity   housing insecurity   poor impulse control   lacks insight into illness   limited support system  Taken 6/4/2024 2000 by Lisa Brewer RN  Anxieties, Fears or Concerns: None verbalized  Goal: Optimized Coping Skills in Response to Life Stressors  Outcome: Ongoing, Progressing  Flowsheets (Taken 6/5/2024 1001)  Optimized Coping Skills in Response to Life Stressors: making progress toward outcome  Intervention: Promote Effective Coping Strategies  Flowsheets (Taken 6/7/2024 1116)  Supportive Measures:   active listening utilized   counseling provided   decision-making supported   goal-setting facilitated   verbalization of feelings encouraged   self-responsibility promoted   self-reflection promoted   positive reinforcement provided   self-care encouraged  Goal: Develops/Participates in Therapeutic [place] to Support Successful Transition  Outcome: Ongoing, Progressing  Flowsheets (Taken 6/5/2024 1001)  Develops/Participates in Therapeutic [place] to Support Successful Transition: making progress toward outcome  Intervention: Foster Therapeutic [place]  Flowsheets (Taken 6/7/2024 0820 by  King, Suri, RN)  Trust Relationship/Rapport:   care explained   choices provided   emotional support provided   empathic listening provided   questions answered   questions encouraged   reassurance provided   thoughts/feelings acknowledged  Intervention: Mutually Develop Transition Plan  Flowsheets  Taken 6/7/2024 1115  Transportation Anticipated: agency  Transportation Concerns: no car  Current Discharge Risk:   substance use/abuse   homeless  Concerns to be Addressed:   substance/tobacco abuse/use   homelessness   coping/stress   cognitive/perceptual   mental health   discharge planning  Readmission Within the Last 30 Days: no previous admission in last 30 days  Patient/Family Anticipated Services at Transition: rehabilitation services  Patient's Choice of Community Agency(s): [facility]  Patient/Family Anticipates Transition to: inpatient rehabilitation facility  Offered/Gave Vendor List: no  Taken 6/6/2024 1112  Discharge Coordination/Progress: Patient does not have insurance coverage. Patient to return to [facility] at discharge, agency to transport.  Taken 6/5/2024 1001  Outpatient/Agency/Support Group Needs: residential services  Transition Support:   follow-up care discussed   follow-up care coordinated   community resources reviewed   crisis management plan promoted   crisis management plan verbalized  Anticipated Discharge Disposition: residential substance use unit         DATA:  Therapist met with Patient individually this date. Patient agreeable to discuss current treatment progress and discharge concerns.     CLINICAL MANUVERING/INTERVENTIONS:    Assisted Patient in processing session content; acknowledged and normalized Patient’s thoughts, feelings, and concerns by utilizing a person-centered approach in efforts to build appropriate rapport and a positive therapeutic relationship with open and honest communication. Allowed Patient to ventilate regarding current stressors and triggers for negative emotions and  thoughts in a safe nonjudgmental environment with unconditional positive regard, active listening skills, and empathy. Therapist implemented motivational interviewing techniques to assist Patient with exploring personal growth and change and discussed distress tolerance skills, self soothing techniques, and applied cognitive behavioral strategies to facilitate identification of maladaptive patterns of thinking and behavior.Therapist utilized dialectical behavior techniques to teach and model emotional regulation and relaxation methods. Therapist assisted Patient with identifying and implementing healthier coping strategies. Therapist assisted Patient with safety planning; Patient agreed to continue honest communication with Treatment Team while inpatient and identify any SI/HI. Patient encouraged to seek nearest ER or contact 911 if danger to self or others post discharge.     ASSESSMENT:  Therapist met with patient briefly at bedside on this date. She continues to receive treatment for detox and reports today that withdrawal symptoms have returned and she's feeling worse. She reports high anxiety and depression, denies SI/HI/AVH. Patient experiencing nausea, vomiting, and shakes. Patient plans to return to [facility] when stable, agency will provide transportation.     PLAN:   Patient will continue stabilization. Patient will continue to receive services offered by Treatment Team.     Patient to return to [facility] at discharge.

## 2024-06-07 NOTE — PLAN OF CARE
Goal Outcome Evaluation:  Plan of Care Reviewed With: patient  Patient Agreement with Plan of Care: agrees     Progress: no change       Patient rates anxiety 9 and depression 8, denies thoughts of harming self and others, and denies hallucinations.

## 2024-06-07 NOTE — PLAN OF CARE
Goal Outcome Evaluation:  Plan of Care Reviewed With: patient  Patient Agreement with Plan of Care: agrees        Outcome Evaluation: Pt reports fair appetite and good sleep. A 9 D 9. Denies SI/HI/AVH.

## 2024-06-07 NOTE — PLAN OF CARE
Goal Outcome Evaluation:  Plan of Care Reviewed With: patient  Patient Agreement with Plan of Care: agrees     Progress: improving     Pt slept well and appetite is fair for shift. Given Trazodone and Zofran prn medications.

## 2024-06-07 NOTE — PROGRESS NOTES
"INPATIENT PSYCHIATRIC PROGRESS NOTE    Name:  Stephy Foley  :  1985  MRN:  [id number]  Visit Number:  [id number]  Length of stay:  3    SUBJECTIVE    CC/Focus of Exam: Opioid use and withdrawals    INTERVAL HISTORY:  The patient reports she is not feeling good today and the withdawals have come back. She threw up her morning dose of Subutex today.  Depression rating 7/10  Anxiety rating 9/10  Sleep: not good  Withdrawal sx: nausea, vomiting, shaking   Cravin/10    Review of Systems   Respiratory: Negative.     Cardiovascular: Negative.    Gastrointestinal:  Positive for nausea and vomiting.   Neurological:  Positive for weakness.   Psychiatric/Behavioral:  Positive for dysphoric mood. The patient is nervous/anxious.        OBJECTIVE    Temp:  [98 °F (36.7 °C)-99.3 °F (37.4 °C)] 99 °F (37.2 °C)  Heart Rate:  [] 99  Resp:  [16-18] 16  BP: ()/(69-91) 121/91    MENTAL STATUS EXAM:  Appearance:Casually dressed, good hygeine.   Cooperation:Cooperative  Psychomotor: No psychomotor agitation/retardation, No EPS, No motor tics  Speech-normal rate, amount.  Mood \"anxious\"   Affect-congruent, appropriate, stable  Thought Content-goal directed, no delusional material present  Thought process-linear, organized.  Suicidality: No SI  Homicidality: No HI  Perception: No AH/VH  Insight-fair   Judgement-fair    Lab Results (last 24 hours)       ** No results found for the last 24 hours. **               Imaging Results (Last 24 Hours)       ** No results found for the last 24 hours. **               ECG/EMG Results (most recent)       None             ALLERGIES: Patient has no known allergies.    Medication Review:   Scheduled Medications:  nicotine, 1 patch, Transdermal, Q24H  pantoprazole, 40 mg, Oral, Q AM         PRN Medications:    acetaminophen    aluminum-magnesium hydroxide-simethicone    benzonatate    benztropine **OR** benztropine    [] cloNIDine **FOLLOWED BY** [] cloNIDine " **FOLLOWED BY** cloNIDine **FOLLOWED BY** [START ON 6/8/2024] cloNIDine    cyclobenzaprine    dicyclomine    famotidine    hydrALAZINE    hydrOXYzine    ibuprofen    loperamide    magnesium hydroxide    ondansetron ODT    sodium chloride    traZODone   All medications reviewed.    ASSESSMENT & PLAN:      Opioid use disorder, severe, dependence  -Clonidine detox  -Comfort meds  -Continue Subutex detox, extend by two more doses       Methamphetamine use disorder, severe, dependence  -Supportive treatment       Severe malnutrition  -Appreciate nutrition consult and recommendations       GERD (gastroesophageal reflux disease)  -Started pantoprazole 40 mg daily    Special precautions: Special Precautions Level 4 (q30 min checks).    Behavioral Health Treatment Plan and Problem List: I have reviewed and approved the Behavioral Health Treatment Plan and Problem list.  The patient has had a chance to review and agrees with the treatment plan.    Copied text in portions of this note has been reviewed and is accurate as of 06/07/24         Clinician:  Tayler Jeff MD  06/07/24  11:08 EDT

## 2024-06-08 PROCEDURE — 63710000001 ONDANSETRON ODT 4 MG TABLET DISPERSIBLE: Performed by: PSYCHIATRY & NEUROLOGY

## 2024-06-08 PROCEDURE — 99232 SBSQ HOSP IP/OBS MODERATE 35: CPT | Performed by: PSYCHIATRY & NEUROLOGY

## 2024-06-08 RX ADMIN — BUPRENORPHINE HCL 2 MG: 2 TABLET SUBLINGUAL at 08:16

## 2024-06-08 RX ADMIN — ONDANSETRON 4 MG: 4 TABLET, ORALLY DISINTEGRATING ORAL at 21:09

## 2024-06-08 RX ADMIN — ONDANSETRON 4 MG: 4 TABLET, ORALLY DISINTEGRATING ORAL at 12:34

## 2024-06-08 RX ADMIN — DICYCLOMINE HYDROCHLORIDE 10 MG: 10 CAPSULE ORAL at 21:09

## 2024-06-08 RX ADMIN — PANTOPRAZOLE SODIUM 40 MG: 40 TABLET, DELAYED RELEASE ORAL at 06:26

## 2024-06-08 RX ADMIN — TRAZODONE HYDROCHLORIDE 50 MG: 50 TABLET ORAL at 21:09

## 2024-06-08 RX ADMIN — CYCLOBENZAPRINE 10 MG: 10 TABLET, FILM COATED ORAL at 08:16

## 2024-06-08 RX ADMIN — DICYCLOMINE HYDROCHLORIDE 10 MG: 10 CAPSULE ORAL at 08:17

## 2024-06-08 RX ADMIN — CYCLOBENZAPRINE 10 MG: 10 TABLET, FILM COATED ORAL at 21:09

## 2024-06-08 RX ADMIN — HYDROXYZINE HYDROCHLORIDE 50 MG: 50 TABLET, FILM COATED ORAL at 08:16

## 2024-06-08 RX ADMIN — IBUPROFEN 400 MG: 400 TABLET, FILM COATED ORAL at 21:09

## 2024-06-08 RX ADMIN — IBUPROFEN 400 MG: 400 TABLET, FILM COATED ORAL at 08:17

## 2024-06-08 RX ADMIN — HYDROXYZINE HYDROCHLORIDE 50 MG: 50 TABLET, FILM COATED ORAL at 17:26

## 2024-06-08 NOTE — PROGRESS NOTES
"INPATIENT PSYCHIATRIC PROGRESS NOTE    Name:  Stephy Foley  :  1985  MRN:  [id number]  Visit Number:  [id number]  Length of stay:  4    SUBJECTIVE  CC/Focus of Exam: opioid use and withdrawals    INTERVAL HISTORY:  Patient is seen for rounding, she states that she is not feeling better, reports appetite is decrease, feels nauseous, hot and cold sweats, leg cramps, back hurting, anxiety is 10/10, depression 8/10. Per staff she said her appetite was good, and she slept for 8.5 hours.  Depression rating 8/10  Anxiety rating 10/10  Sleep: 8.5 hours  Withdrawal sx: body aches, leg cramps, back hurting, hot and cold sweats  Cravin/10    Review of Systems   Constitutional:  Positive for appetite change, chills and diaphoresis.   Gastrointestinal:  Positive for nausea.   Musculoskeletal:  Positive for back pain and myalgias.   Psychiatric/Behavioral:  Positive for dysphoric mood and sleep disturbance. The patient is nervous/anxious.        OBJECTIVE    Temp:  [97.6 °F (36.4 °C)-98.9 °F (37.2 °C)] 97.8 °F (36.6 °C)  Heart Rate:  [] 91  Resp:  [16-18] 16  BP: (103-125)/(61-83) 121/80    MENTAL STATUS EXAM:  Appearance: Casually dressed, good hygeine.   Cooperation: Cooperative  Psychomotor: No psychomotor agitation/retardation, No EPS, No motor tics  Speech: normal rate, amount.  Mood: \"not good\"   Affect: not appears to be in distress  Thought Content: goal directed, no delusional material present  Thought process: linear, organized.  Suicidality: No SI  Homicidality: No HI  Perception: No AH/VH  Insight: fair   Judgment: fair    Lab Results (last 24 hours)       ** No results found for the last 24 hours. **               Imaging Results (Last 24 Hours)       ** No results found for the last 24 hours. **               ECG/EMG Results (most recent)       None             ALLERGIES: Patient has no known allergies.      Current Facility-Administered Medications:     acetaminophen (TYLENOL) tablet 650 mg, " 650 mg, Oral, Q6H PRN, Leodan Mallory MD, 650 mg at 24 0815    aluminum-magnesium hydroxide-simethicone (MAALOX MAX) 400-400-40 MG/5ML suspension 15 mL, 15 mL, Oral, Q6H PRN, Leodan Mallory MD, 15 mL at 24 0009    benzonatate (TESSALON) capsule 100 mg, 100 mg, Oral, TID PRN, Leodan Mallory MD    benztropine (COGENTIN) tablet 2 mg, 2 mg, Oral, Once PRN **OR** benztropine (COGENTIN) injection 1 mg, 1 mg, Intramuscular, Once PRN, Leodan Mallory MD    [] cloNIDine (CATAPRES) tablet 0.1 mg, 0.1 mg, Oral, 4x Daily PRN, 0.1 mg at 24 1344 **FOLLOWED BY** [] cloNIDine (CATAPRES) tablet 0.1 mg, 0.1 mg, Oral, TID PRN **FOLLOWED BY** [] cloNIDine (CATAPRES) tablet 0.1 mg, 0.1 mg, Oral, BID PRN, 0.1 mg at 24 1500 **FOLLOWED BY** cloNIDine (CATAPRES) tablet 0.1 mg, 0.1 mg, Oral, Once PRN, Leodan Mallory MD    cyclobenzaprine (FLEXERIL) tablet 10 mg, 10 mg, Oral, TID PRN, Leodan Mallory MD, 10 mg at 24 0816    dicyclomine (BENTYL) capsule 10 mg, 10 mg, Oral, TID PRN, Leodan Mallory MD, 10 mg at 24 0817    famotidine (PEPCID) tablet 20 mg, 20 mg, Oral, BID PRN, Leodan Mallory MD, 20 mg at 24 0955    hydrALAZINE (APRESOLINE) tablet 25 mg, 25 mg, Oral, Daily PRN, Leodan Mallory MD    hydrOXYzine (ATARAX) tablet 50 mg, 50 mg, Oral, Q6H PRN, Leodan Mallory MD, 50 mg at 24 0816    ibuprofen (ADVIL,MOTRIN) tablet 400 mg, 400 mg, Oral, Q6H PRN, Leodan Mallory MD, 400 mg at 24 0817    loperamide (IMODIUM) capsule 2 mg, 2 mg, Oral, Q2H PRN, Leodan Mallory MD, 2 mg at 24 1348    magnesium hydroxide (MILK OF MAGNESIA) suspension 10 mL, 10 mL, Oral, Daily PRN, Leodan Mallory MD    nicotine (NICODERM CQ) 21 MG/24HR patch 1 patch, 1 patch, Transdermal, Q24H, Marquise Funk MD, 1 patch at 24 0821    ondansetron ODT (ZOFRAN-ODT) disintegrating tablet 4 mg, 4 mg, Translingual, Q6H PRN, Leodan Mallory MD, 4 mg at  06/08/24 1234    pantoprazole (PROTONIX) EC tablet 40 mg, 40 mg, Oral, Q AM, Tayler Jeff MD, 40 mg at 06/08/24 0626    sodium chloride nasal spray 2 spray, 2 spray, Each Nare, PRN, Leodan Mallory MD    traZODone (DESYREL) tablet 50 mg, 50 mg, Oral, Nightly PRN, Leodan Mallory MD, 50 mg at 06/07/24 3968    Reviewed chart, notes, vitals, labs and EKG personally    ASSESSMENT & PLAN:      Opioid use disorder, severe, dependence  Short Subutex   Clonidine detox   Comfort meds    Severe malnutrition  Appreciate nutriton consult, Boost twice daily     Methamphetamine use disorder, severe, dependence  Encouraged abstinence.    GERD (gastroesophageal reflux disease)  Pantoprazole 40 mg po q daily     Special precautions: Special Precautions Level 4 (q30 min checks)    Behavioral Health Treatment Plan and Problem List: I have reviewed and approved the Behavioral Health Treatment Plan and Problem list.  The patient has had a chance to review and agrees with the treatment plan.     Clinician:  Julito Bridges MD  06/08/24  13:15 EDT

## 2024-06-08 NOTE — PLAN OF CARE
Goal Outcome Evaluation:  Plan of Care Reviewed With: patient  Patient Agreement with Plan of Care: agrees     Progress: improving  Outcome Evaluation: Patient reported poor sleep and appetite. Patient denied SI/HI/AVH. Patient rates anxiety 9/10 and depression an 8/10. Calm and cooperative with staff, socialized well with peers.

## 2024-06-08 NOTE — PLAN OF CARE
Goal Outcome Evaluation:  Plan of Care Reviewed With: patient  Patient Agreement with Plan of Care: agrees     Progress: improving       PT appetite is good and participated in group. Pt given Flexeril, Atarax, Motrin, Zofran, and Trazodone prn medications.

## 2024-06-09 PROCEDURE — 93005 ELECTROCARDIOGRAM TRACING: CPT | Performed by: PSYCHIATRY & NEUROLOGY

## 2024-06-09 PROCEDURE — 99232 SBSQ HOSP IP/OBS MODERATE 35: CPT | Performed by: PSYCHIATRY & NEUROLOGY

## 2024-06-09 PROCEDURE — 63710000001 ONDANSETRON ODT 4 MG TABLET DISPERSIBLE: Performed by: PSYCHIATRY & NEUROLOGY

## 2024-06-09 RX ORDER — BUPRENORPHINE 2 MG/1
2 TABLET SUBLINGUAL DAILY
Status: COMPLETED | OUTPATIENT
Start: 2024-06-09 | End: 2024-06-09

## 2024-06-09 RX ORDER — CLONIDINE HYDROCHLORIDE 0.1 MG/1
0.1 TABLET ORAL EVERY 6 HOURS PRN
Status: DISCONTINUED | OUTPATIENT
Start: 2024-06-09 | End: 2024-06-10 | Stop reason: HOSPADM

## 2024-06-09 RX ORDER — BUPRENORPHINE 2 MG/1
2 TABLET SUBLINGUAL DAILY
Status: DISCONTINUED | OUTPATIENT
Start: 2024-06-09 | End: 2024-06-09

## 2024-06-09 RX ORDER — LORAZEPAM 1 MG/1
1 TABLET ORAL EVERY 6 HOURS PRN
Status: DISCONTINUED | OUTPATIENT
Start: 2024-06-09 | End: 2024-06-10 | Stop reason: HOSPADM

## 2024-06-09 RX ORDER — LORAZEPAM 2 MG/1
2 TABLET ORAL ONCE
Status: COMPLETED | OUTPATIENT
Start: 2024-06-09 | End: 2024-06-09

## 2024-06-09 RX ADMIN — ACETAMINOPHEN 650 MG: 325 TABLET ORAL at 20:16

## 2024-06-09 RX ADMIN — ONDANSETRON 4 MG: 4 TABLET, ORALLY DISINTEGRATING ORAL at 08:06

## 2024-06-09 RX ADMIN — BUPRENORPHINE HCL 2 MG: 2 TABLET SUBLINGUAL at 20:16

## 2024-06-09 RX ADMIN — LORAZEPAM 2 MG: 2 TABLET ORAL at 10:23

## 2024-06-09 RX ADMIN — ACETAMINOPHEN 650 MG: 325 TABLET ORAL at 08:07

## 2024-06-09 RX ADMIN — PANTOPRAZOLE SODIUM 40 MG: 40 TABLET, DELAYED RELEASE ORAL at 06:13

## 2024-06-09 RX ADMIN — TRAZODONE HYDROCHLORIDE 50 MG: 50 TABLET ORAL at 20:16

## 2024-06-09 RX ADMIN — CYCLOBENZAPRINE 10 MG: 10 TABLET, FILM COATED ORAL at 08:07

## 2024-06-09 RX ADMIN — HYDROXYZINE HYDROCHLORIDE 50 MG: 50 TABLET, FILM COATED ORAL at 08:07

## 2024-06-09 RX ADMIN — HYDROXYZINE HYDROCHLORIDE 50 MG: 50 TABLET, FILM COATED ORAL at 20:16

## 2024-06-09 RX ADMIN — BUPRENORPHINE HCL 2 MG: 2 TABLET SUBLINGUAL at 10:00

## 2024-06-09 RX ADMIN — DICYCLOMINE HYDROCHLORIDE 10 MG: 10 CAPSULE ORAL at 08:07

## 2024-06-09 NOTE — NURSING NOTE
Pt is on fall precautions and is a smoker, informed Dr. Bridges.     New order:     Nursing communication: Pt is allowed to go on smoke breaks RBTOx2 Dr. Bridges

## 2024-06-09 NOTE — PLAN OF CARE
Goal Outcome Evaluation:  Plan of Care Reviewed With: patient  Patient Agreement with Plan of Care: agrees           Pt rated anxiety 8/10, depression 8/10, and cravings 9/10. Pt denies SI/HI/AVH. Pt was given prn flexeril, bentyl, ibuprofen, zofran, and trazodone. Pt did not voice any concerns. No acute s/s of distress noted.

## 2024-06-09 NOTE — PLAN OF CARE
Problem: Adult Behavioral Health Plan of Care  Goal: Plan of Care Review  Outcome: Ongoing, Progressing  Flowsheets  Taken 6/9/2024 1734  Plan of Care Reviewed With: patient  Patient Agreement with Plan of Care: agrees  Outcome Evaluation: Client slept a lot today. she had fluctuating blood pressures and pulses obtained today. ativan was given and clonidine q6 prn.  Taken 6/9/2024 0809  Plan of Care Reviewed With: patient  Patient Agreement with Plan of Care: agrees   Goal Outcome Evaluation:  Plan of Care Reviewed With: patient  Patient Agreement with Plan of Care: agrees        Outcome Evaluation: Client slept a lot today. she had fluctuating blood pressures and pulses obtained today. ativan was given and clonidine q6 prn.

## 2024-06-09 NOTE — PROGRESS NOTES
"INPATIENT PSYCHIATRIC PROGRESS NOTE    Name:  Stephy Foley  :  1985  MRN:  [id number]  Visit Number:  [id number]  Length of stay:  5    SUBJECTIVE  CC/Focus of Exam: opioid use disorder and withdrawals    INTERVAL HISTORY:  Patient is seen for rounding, patient reported not feeling good at all, she continues to feel nauseous foggy, no energy, restless legs, hot flashes, nightmares, runny nose, her blood pressure is significantly elevated and she received a dose of Subutex 2 mg this morning and despite that the diastolic was elevated about 112 and at which time patient is given Ativan 2 mg p.o. 1 dose, her blood pressure after recheck is 95/68, pulse is 128.  I decided to repeat the EKG and continues to have detected toxic screen and by couple of more doses.  Patient slept for 8 hours per staff.  Depression rating 9/10  Anxiety rating 9/10  Sleep: poor  Withdrawal sx: Low energy, nausea, restless legs, hot flashes, nightmares, runny nose, stomach cramps.  Cravin/10    Review of Systems   Constitutional:  Positive for chills and diaphoresis.   Gastrointestinal:  Positive for nausea.   Musculoskeletal:  Positive for myalgias.   Psychiatric/Behavioral:  Positive for dysphoric mood and sleep disturbance. The patient is nervous/anxious.        OBJECTIVE    Temp:  [97.6 °F (36.4 °C)-99 °F (37.2 °C)] 99 °F (37.2 °C)  Heart Rate:  [] 69  Resp:  [16-20] 16  BP: (103-170)/() 155/100    MENTAL STATUS EXAM:  Appearance: Casually dressed, good hygeine.   Cooperation: Cooperative  Psychomotor: No psychomotor agitation/retardation, No EPS, No motor tics  Speech: normal rate, amount.  Mood: \"not good\"   Affect: Appears as in discomfort  Thought Content: goal directed, no delusional material present  Thought process: linear, organized.  Suicidality: No SI  Homicidality: No HI  Perception: No AH/VH  Insight: fair   Judgment: fair    Lab Results (last 24 hours)       ** No results found for the last 24 " hours. **               Imaging Results (Last 24 Hours)       ** No results found for the last 24 hours. **               ECG/EMG Results (most recent)       None             ALLERGIES: Patient has no known allergies.      Current Facility-Administered Medications:     acetaminophen (TYLENOL) tablet 650 mg, 650 mg, Oral, Q6H PRN, Leodan Mallory MD, 650 mg at 06/09/24 0807    aluminum-magnesium hydroxide-simethicone (MAALOX MAX) 400-400-40 MG/5ML suspension 15 mL, 15 mL, Oral, Q6H PRN, Leodan Mallory MD, 15 mL at 06/05/24 0009    benzonatate (TESSALON) capsule 100 mg, 100 mg, Oral, TID PRN, Leodan Mallory MD    benztropine (COGENTIN) tablet 2 mg, 2 mg, Oral, Once PRN **OR** benztropine (COGENTIN) injection 1 mg, 1 mg, Intramuscular, Once PRN, Leodan Mallory MD    buprenorphine (SUBUTEX) SL tablet 2 mg, 2 mg, Sublingual, Daily, Julito Bridges MD    cyclobenzaprine (FLEXERIL) tablet 10 mg, 10 mg, Oral, TID PRN, Leodan Mallory MD, 10 mg at 06/09/24 0807    dicyclomine (BENTYL) capsule 10 mg, 10 mg, Oral, TID PRN, Leodan Mallory MD, 10 mg at 06/09/24 0807    famotidine (PEPCID) tablet 20 mg, 20 mg, Oral, BID PRN, Leodan Mallory MD, 20 mg at 06/05/24 0955    hydrALAZINE (APRESOLINE) tablet 25 mg, 25 mg, Oral, Daily PRN, Leodan Mallory MD    hydrOXYzine (ATARAX) tablet 50 mg, 50 mg, Oral, Q6H PRN, Leodan Mallory MD, 50 mg at 06/09/24 0807    ibuprofen (ADVIL,MOTRIN) tablet 400 mg, 400 mg, Oral, Q6H PRN, Leodan Mallory MD, 400 mg at 06/08/24 2109    loperamide (IMODIUM) capsule 2 mg, 2 mg, Oral, Q2H PRN, Leodan Mallory MD, 2 mg at 06/05/24 1348    LORazepam (ATIVAN) tablet 1 mg, 1 mg, Oral, Q6H PRN, Julito Bridges MD    magnesium hydroxide (MILK OF MAGNESIA) suspension 10 mL, 10 mL, Oral, Daily PRN, Leodan Mallory MD    nicotine (NICODERM CQ) 21 MG/24HR patch 1 patch, 1 patch, Transdermal, Q24H, Marquise Funk MD, 1 patch at 06/06/24 0821    ondansetron ODT (ZOFRAN-ODT)  disintegrating tablet 4 mg, 4 mg, Translingual, Q6H PRN, Leodan Mallory MD, 4 mg at 06/09/24 0806    pantoprazole (PROTONIX) EC tablet 40 mg, 40 mg, Oral, Q AM, Tayler Jeff MD, 40 mg at 06/09/24 0613    sodium chloride nasal spray 2 spray, 2 spray, Each Nare, PRN, Leodan Mallory MD    traZODone (DESYREL) tablet 50 mg, 50 mg, Oral, Nightly PRN, Leodan Mallory MD, 50 mg at 06/08/24 2109    Reviewed chart, notes, vitals, labs and EKG personally    ASSESSMENT & PLAN:    Opioid use disorder, severe dependence  Opioid withdrawals  Clonidine detox almost finished but patient continues to have withdrawal symptoms  Subutex detox extended by 2 more days  Methamphetamine use disorder severe dependence supportive treatment  severe malnutrition-dietary ordered boost supplements  Gastroesophageal reflux disease pantoprazole 40 mg daily        Special precautions: Special Precautions Level 4 (q30 min checks)    Behavioral Health Treatment Plan and Problem List: I have reviewed and approved the Behavioral Health Treatment Plan and Problem list.  The patient has had a chance to review and agrees with the treatment plan.     Clinician:  Julito Bridges MD  06/09/24  12:32 EDT

## 2024-06-10 VITALS
RESPIRATION RATE: 16 BRPM | SYSTOLIC BLOOD PRESSURE: 119 MMHG | HEIGHT: 67 IN | HEART RATE: 100 BPM | TEMPERATURE: 97.4 F | DIASTOLIC BLOOD PRESSURE: 87 MMHG | BODY MASS INDEX: 16.57 KG/M2 | WEIGHT: 105.6 LBS | OXYGEN SATURATION: 98 %

## 2024-06-10 LAB
QT INTERVAL: 388 MS
QTC INTERVAL: 497 MS

## 2024-06-10 PROCEDURE — 99238 HOSP IP/OBS DSCHRG MGMT 30/<: CPT | Performed by: PSYCHIATRY & NEUROLOGY

## 2024-06-10 PROCEDURE — 63710000001 ONDANSETRON ODT 4 MG TABLET DISPERSIBLE: Performed by: PSYCHIATRY & NEUROLOGY

## 2024-06-10 RX ADMIN — PANTOPRAZOLE SODIUM 40 MG: 40 TABLET, DELAYED RELEASE ORAL at 05:58

## 2024-06-10 RX ADMIN — IBUPROFEN 400 MG: 400 TABLET, FILM COATED ORAL at 08:15

## 2024-06-10 RX ADMIN — ONDANSETRON 4 MG: 4 TABLET, ORALLY DISINTEGRATING ORAL at 08:15

## 2024-06-10 RX ADMIN — HYDROXYZINE HYDROCHLORIDE 50 MG: 50 TABLET, FILM COATED ORAL at 08:15

## 2024-06-10 NOTE — CASE MANAGEMENT/SOCIAL WORK
Patient Name:  Stephy Foley  YOB: 1985  MRN: [id number]  Admit Date:  6/4/2024    Patient is being discharged back to [facility] on this date, agency to provide transportation. Patient reports improvement in mood and withdrawal symptoms, denies SI/HI/AVH. Healthy coping skills and relapse prevention have been reviewed. Patient has been assisted with identifying risk factors which would indicate the need for higher level of care including thoughts to harm self or others and/or self-harming behavior. Encouraged patient to notify [facility] staff, call [phone] or present to the nearest emergency room should any of these events occur. No other needs identified.     Electronically signed by:  MAYI Shepard  06/10/24 11:24 EDT

## 2024-06-10 NOTE — DISCHARGE SUMMARY
":  1985  MRN:  [id number]  Visit Number:  [id number]      Date of Admission:2024   Date of Discharge:  6/10/2024    Discharge Diagnosis:  Principal Problem:    Opioid use disorder, severe, dependence  Active Problems:    Severe malnutrition    Methamphetamine use disorder, severe, dependence    GERD (gastroesophageal reflux disease)        Admission Diagnosis:  Substance abuse [F19.10]     ERICK Foley is a 38 y.o. female who was admitted on 2024 with complaints of fentanyl and meth use and withdrawals.   For details please see H&P dated 24.    Hospital Course  Patient is a 38 y.o. female presented with opioid and meth use and withdrawals. The patient was admitted to the [facility] detox recovery unit for safety, further evaluation and treatment.  The patient was started on clonidine and Subutex detox protocols and she was able to complete it without any adverse events.  Nutrition consult was done and patient was provided nutrition supplements.   The patient was also able to take part in individual and group counseling sessions and work on appropriate coping skills.  The patient made steady improvement in her withdrawals and mood and expressed feeling more positive and hopeful about future. Sleep and appetite were improved.  The day of discharge the patient was calm, cooperative and pleasant. Mood was reported to be good, and denied SI/HI/AVH. Also reported no medication side effects.        Mental Status Exam upon discharge:   Mood \"better\"   Affect-congruent, appropriate, stable  Thought Content-goal directed, no delusional material present  Thought process-linear, organized.  Suicidality: No SI  Homicidality: No HI  Perception: No AH/    Procedures Performed         Consults:   Consults       No orders found from 2024 to 2024.            Pertinent Test Results:   Admission on 2024   Component Date Value Ref Range Status    Hepatitis B Surface Ag 2024 " Non-Reactive  Non-Reactive Final    Hep A IgM 06/05/2024 Non-Reactive  Non-Reactive Final    Hep B C IgM 06/05/2024 Non-Reactive  Non-Reactive Final    Hepatitis C Ab 06/05/2024 Non-Reactive  Non-Reactive Final    QT Interval 06/09/2024 388  ms Final    QTC Interval 06/09/2024 497  ms Final   Admission on 06/04/2024, Discharged on 06/04/2024   Component Date Value Ref Range Status    Glucose 06/04/2024 128 (H)  65 - 99 mg/dL Final    BUN 06/04/2024 12  6 - 20 mg/dL Final    Creatinine 06/04/2024 0.80  0.57 - 1.00 mg/dL Final    Sodium 06/04/2024 136  136 - 145 mmol/L Final    Potassium 06/04/2024 3.6  3.5 - 5.2 mmol/L Final    Chloride 06/04/2024 101  98 - 107 mmol/L Final    CO2 06/04/2024 21.3 (L)  22.0 - 29.0 mmol/L Final    Calcium 06/04/2024 10.1  8.6 - 10.5 mg/dL Final    Total Protein 06/04/2024 8.1  6.0 - 8.5 g/dL Final    Albumin 06/04/2024 4.6  3.5 - 5.2 g/dL Final    ALT (SGPT) 06/04/2024 11  1 - 33 U/L Final    AST (SGOT) 06/04/2024 13  1 - 32 U/L Final    Alkaline Phosphatase 06/04/2024 128 (H)  39 - 117 U/L Final    Total Bilirubin 06/04/2024 0.7  0.0 - 1.2 mg/dL Final    Globulin 06/04/2024 3.5  gm/dL Final    A/G Ratio 06/04/2024 1.3  g/dL Final    BUN/Creatinine Ratio 06/04/2024 15.0  7.0 - 25.0 Final    Anion Gap 06/04/2024 13.7  5.0 - 15.0 mmol/L Final    eGFR 06/04/2024 96.9  >60.0 mL/min/1.73 Final    HCG, Urine QL 06/04/2024 Negative  Negative Final    Color, UA 06/04/2024 Dark Yellow (A)  Yellow, Straw Final    Appearance, UA 06/04/2024 Cloudy (A)  Clear Final    pH, UA 06/04/2024 5.5  5.0 - 8.0 Final    Specific Gravity, UA 06/04/2024 >1.030 (H)  1.005 - 1.030 Final    Glucose, UA 06/04/2024 Negative  Negative Final    Ketones, UA 06/04/2024 Trace (A)  Negative Final    Bilirubin, UA 06/04/2024 Small (1+) (A)  Negative Final    Blood, UA 06/04/2024 Negative  Negative Final    Protein, UA 06/04/2024 100 mg/dL (2+) (A)  Negative Final    Leuk Esterase, UA 06/04/2024 Negative  Negative Final     Nitrite, UA 06/04/2024 Negative  Negative Final    Urobilinogen, UA 06/04/2024 0.2 E.U./dL  0.2 - 1.0 E.U./dL Final    Ethanol 06/04/2024 <10  0 - 10 mg/dL Final    Ethanol % 06/04/2024 <0.010  % Final    THC, Screen, Urine 06/04/2024 Negative  Negative Final    Phencyclidine (PCP), Urine 06/04/2024 Negative  Negative Final    Cocaine Screen, Urine 06/04/2024 Negative  Negative Final    Methamphetamine, Ur 06/04/2024 Positive (A)  Negative Final    Opiate Screen 06/04/2024 Negative  Negative Final    Amphetamine Screen, Urine 06/04/2024 Positive (A)  Negative Final    Benzodiazepine Screen, Urine 06/04/2024 Negative  Negative Final    Tricyclic Antidepressants Screen 06/04/2024 Negative  Negative Final    Methadone Screen, Urine 06/04/2024 Negative  Negative Final    Barbiturates Screen, Urine 06/04/2024 Negative  Negative Final    Oxycodone Screen, Urine 06/04/2024 Negative  Negative Final    Buprenorphine, Screen, Urine 06/04/2024 Negative  Negative Final    Magnesium 06/04/2024 1.9  1.6 - 2.6 mg/dL Final    WBC 06/04/2024 11.52 (H)  3.40 - 10.80 10*3/mm3 Final    RBC 06/04/2024 6.04 (H)  3.77 - 5.28 10*6/mm3 Final    Hemoglobin 06/04/2024 14.8  12.0 - 15.9 g/dL Final    Hematocrit 06/04/2024 46.1  34.0 - 46.6 % Final    MCV 06/04/2024 76.3 (L)  79.0 - 97.0 fL Final    MCH 06/04/2024 24.5 (L)  26.6 - 33.0 pg Final    MCHC 06/04/2024 32.1  31.5 - 35.7 g/dL Final    RDW 06/04/2024 14.0  12.3 - 15.4 % Final    RDW-SD 06/04/2024 38.0  37.0 - 54.0 fl Final    MPV 06/04/2024 9.2  6.0 - 12.0 fL Final    Platelets 06/04/2024 464 (H)  140 - 450 10*3/mm3 Final    Neutrophil % 06/04/2024 83.5 (H)  42.7 - 76.0 % Final    Lymphocyte % 06/04/2024 12.5 (L)  19.6 - 45.3 % Final    Monocyte % 06/04/2024 3.2 (L)  5.0 - 12.0 % Final    Eosinophil % 06/04/2024 0.1 (L)  0.3 - 6.2 % Final    Basophil % 06/04/2024 0.3  0.0 - 1.5 % Final    Immature Grans % 06/04/2024 0.4  0.0 - 0.5 % Final    Neutrophils, Absolute 06/04/2024 9.62 (H)   1.70 - 7.00 10*3/mm3 Final    Lymphocytes, Absolute 06/04/2024 1.44  0.70 - 3.10 10*3/mm3 Final    Monocytes, Absolute 06/04/2024 0.37  0.10 - 0.90 10*3/mm3 Final    Eosinophils, Absolute 06/04/2024 0.01  0.00 - 0.40 10*3/mm3 Final    Basophils, Absolute 06/04/2024 0.03  0.00 - 0.20 10*3/mm3 Final    Immature Grans, Absolute 06/04/2024 0.05  0.00 - 0.05 10*3/mm3 Final    nRBC 06/04/2024 0.0  0.0 - 0.2 /100 WBC Final    Fentanyl, Urine 06/04/2024 Positive (A)  Negative Final    RBC, UA 06/04/2024 3-5 (A)  None Seen, 0-2 /HPF Final    WBC, UA 06/04/2024 3-5 (A)  None Seen, 0-2 /HPF Final    Bacteria, UA 06/04/2024 None Seen  None Seen /HPF Final    Squamous Epithelial Cells, UA 06/04/2024 7-12 (A)  None Seen, 0-2 /HPF Final    Hyaline Casts, UA 06/04/2024 None Seen  None Seen /LPF Final    Methodology 06/04/2024 Manual Light Microscopy   Final    QT Interval 06/04/2024 418  ms Final    QTC Interval 06/04/2024 479  ms Final        Condition on Discharge:  improved    Vital Signs  Temp:  [97.4 °F (36.3 °C)-98.4 °F (36.9 °C)] 97.4 °F (36.3 °C)  Heart Rate:  [] 100  Resp:  [16-20] 16  BP: ()/(55-87) 119/87      Discharge Disposition:  Rehab Facility or Unit (DC - External)    Discharge Medications:     Discharge Medications      Patient Not Prescribed Medications Upon Discharge         Discharge Diet: Regular     Activity at Discharge: As tolerated     Follow-up Appointments  [facility]      Time: I spent  < 30  minutes on this discharge activity which included: face-to-face encounter with the patient, reviewing the data in the system, coordination of the care with the nursing staff as well as consultants, documentation, and entering orders.        Clinician:   Tayler Jeff MD  06/10/24  11:24 EDT

## 2024-06-10 NOTE — PLAN OF CARE
Goal Outcome Evaluation:  Plan of Care Reviewed With: patient  Patient Agreement with Plan of Care: agrees         Pt rated anxiety 9/10, depression 9/10, and cravings 7/10. Pt denies SI/HI/AVH. Pt was given prn tylenol, atarax, and trazodone. No acute s/s of distress noted.